# Patient Record
Sex: MALE | Race: OTHER | NOT HISPANIC OR LATINO | URBAN - METROPOLITAN AREA
[De-identification: names, ages, dates, MRNs, and addresses within clinical notes are randomized per-mention and may not be internally consistent; named-entity substitution may affect disease eponyms.]

---

## 2018-03-01 ENCOUNTER — OUTPATIENT (OUTPATIENT)
Dept: OUTPATIENT SERVICES | Facility: HOSPITAL | Age: 63
LOS: 1 days | End: 2018-03-01
Payer: MEDICAID

## 2018-03-01 PROCEDURE — G9001: CPT

## 2018-03-04 ENCOUNTER — EMERGENCY (EMERGENCY)
Facility: HOSPITAL | Age: 63
LOS: 1 days | Discharge: HOME | End: 2018-03-04
Attending: EMERGENCY MEDICINE | Admitting: EMERGENCY MEDICINE
Payer: MEDICAID

## 2018-03-04 VITALS
HEART RATE: 54 BPM | OXYGEN SATURATION: 97 % | SYSTOLIC BLOOD PRESSURE: 136 MMHG | TEMPERATURE: 98 F | DIASTOLIC BLOOD PRESSURE: 85 MMHG | RESPIRATION RATE: 16 BRPM

## 2018-03-04 PROCEDURE — 12001 RPR S/N/AX/GEN/TRNK 2.5CM/<: CPT

## 2018-03-04 PROCEDURE — 99283 EMERGENCY DEPT VISIT LOW MDM: CPT | Mod: 25

## 2018-03-04 PROCEDURE — 99282 EMERGENCY DEPT VISIT SF MDM: CPT | Mod: 25

## 2018-03-04 PROCEDURE — 90715 TDAP VACCINE 7 YRS/> IM: CPT

## 2018-03-04 PROCEDURE — 90471 IMMUNIZATION ADMIN: CPT

## 2018-03-04 RX ORDER — TETANUS TOXOID, REDUCED DIPHTHERIA TOXOID AND ACELLULAR PERTUSSIS VACCINE, ADSORBED 5; 2.5; 8; 8; 2.5 [IU]/.5ML; [IU]/.5ML; UG/.5ML; UG/.5ML; UG/.5ML
0.5 SUSPENSION INTRAMUSCULAR ONCE
Qty: 0 | Refills: 0 | Status: COMPLETED | OUTPATIENT
Start: 2018-03-04 | End: 2018-03-04

## 2018-03-04 RX ORDER — ACETAMINOPHEN 500 MG
975 TABLET ORAL ONCE
Qty: 0 | Refills: 0 | Status: COMPLETED | OUTPATIENT
Start: 2018-03-04 | End: 2018-03-04

## 2018-03-04 RX ADMIN — Medication 975 MILLIGRAM(S): at 13:28

## 2018-03-04 RX ADMIN — TETANUS TOXOID, REDUCED DIPHTHERIA TOXOID AND ACELLULAR PERTUSSIS VACCINE, ADSORBED 0.5 MILLILITER(S): 5; 2.5; 8; 8; 2.5 SUSPENSION INTRAMUSCULAR at 13:28

## 2018-03-04 NOTE — ED PROVIDER NOTE - OBJECTIVE STATEMENT
61yo male pt with PMHx of HTN, HLD, DM (FS- 120s), CAD s/p stents (on Plavix, ASA) c/o right dominant index finger tip laceration by a shaving blade yesterday 6pm. Pt stated the wound kept bleeding with 63yo male pt with PMHx of HTN, HLD, DM (FS- 120s), CAD s/p stents (on Plavix, ASA) c/o right dominant index finger tip laceration by a shaving blade yesterday 6pm. Pt stated the wound kept bleeding with soreness. Denies fever, chills or recent sickness. Denies sensory changes or weakness to fingers. Unknown TD.

## 2018-03-04 NOTE — ED ADULT NURSE NOTE - OBJECTIVE STATEMENT
61 yo male presents to ED with laceration to right index finger sustained last night with razor blade ~6PM. Patient on aspirin and plavix. Patient reports that wound continued to bleed into this morning so he came to ED.

## 2018-03-04 NOTE — ED PROVIDER NOTE - ATTENDING CONTRIBUTION TO CARE
63yo male pt with PMHx of HTN, HLD, DM (FS- 120s), CAD s/p stents (on Plavix, ASA) c/o right dominant index finger tip laceration by a shaving blade yesterday with no active bleeding to the tip, tissue loss, dermabond, after irrigation, no abx at this time, td.

## 2018-03-04 NOTE — ED PROVIDER NOTE - PHYSICAL EXAMINATION
NAD, VSS, Afebrile, + Right 2nd phalanx, superficial avulsion laceration (0.5cm) without bleeding, No swelling, eryth or cellulitis, N/V- intact.

## 2018-03-04 NOTE — ED ADULT NURSE NOTE - CHIEF COMPLAINT QUOTE
"I cut my right finger on a razor last night and it won't stop bleeding", pt takes plavix and aspirin, denies dizziness or lightheadedness.

## 2018-03-04 NOTE — ED ADULT TRIAGE NOTE - CHIEF COMPLAINT QUOTE
"I cut my right finger on a razor last night and it won't stop bleeding", pt takes plavix and aspirin,

## 2018-03-08 DIAGNOSIS — R69 ILLNESS, UNSPECIFIED: ICD-10-CM

## 2019-03-25 ENCOUNTER — EMERGENCY (EMERGENCY)
Facility: HOSPITAL | Age: 64
LOS: 1 days | Discharge: HOME | End: 2019-03-25
Attending: EMERGENCY MEDICINE | Admitting: EMERGENCY MEDICINE
Payer: MEDICAID

## 2019-03-25 VITALS
DIASTOLIC BLOOD PRESSURE: 87 MMHG | RESPIRATION RATE: 16 BRPM | OXYGEN SATURATION: 99 % | TEMPERATURE: 97 F | HEART RATE: 51 BPM | SYSTOLIC BLOOD PRESSURE: 181 MMHG

## 2019-03-25 VITALS
RESPIRATION RATE: 16 BRPM | TEMPERATURE: 98 F | OXYGEN SATURATION: 100 % | SYSTOLIC BLOOD PRESSURE: 145 MMHG | HEART RATE: 52 BPM | DIASTOLIC BLOOD PRESSURE: 82 MMHG

## 2019-03-25 LAB
ALBUMIN SERPL ELPH-MCNC: 4.3 G/DL — SIGNIFICANT CHANGE UP (ref 3.3–5)
ALP SERPL-CCNC: 71 U/L — SIGNIFICANT CHANGE UP (ref 40–120)
ALT FLD-CCNC: 22 U/L — SIGNIFICANT CHANGE UP (ref 4–41)
ANION GAP SERPL CALC-SCNC: 14 MMO/L — SIGNIFICANT CHANGE UP (ref 7–14)
APTT BLD: 35.4 SEC — SIGNIFICANT CHANGE UP (ref 27.5–36.3)
AST SERPL-CCNC: 19 U/L — SIGNIFICANT CHANGE UP (ref 4–40)
BASOPHILS # BLD AUTO: 0.03 K/UL — SIGNIFICANT CHANGE UP (ref 0–0.2)
BASOPHILS NFR BLD AUTO: 0.4 % — SIGNIFICANT CHANGE UP (ref 0–2)
BILIRUB SERPL-MCNC: 0.9 MG/DL — SIGNIFICANT CHANGE UP (ref 0.2–1.2)
BUN SERPL-MCNC: 10 MG/DL — SIGNIFICANT CHANGE UP (ref 7–23)
CALCIUM SERPL-MCNC: 9.5 MG/DL — SIGNIFICANT CHANGE UP (ref 8.4–10.5)
CHLORIDE SERPL-SCNC: 103 MMOL/L — SIGNIFICANT CHANGE UP (ref 98–107)
CO2 SERPL-SCNC: 25 MMOL/L — SIGNIFICANT CHANGE UP (ref 22–31)
CREAT SERPL-MCNC: 0.94 MG/DL — SIGNIFICANT CHANGE UP (ref 0.5–1.3)
EOSINOPHIL # BLD AUTO: 0.22 K/UL — SIGNIFICANT CHANGE UP (ref 0–0.5)
EOSINOPHIL NFR BLD AUTO: 3.2 % — SIGNIFICANT CHANGE UP (ref 0–6)
GLUCOSE SERPL-MCNC: 96 MG/DL — SIGNIFICANT CHANGE UP (ref 70–99)
HCT VFR BLD CALC: 43.5 % — SIGNIFICANT CHANGE UP (ref 39–50)
HGB BLD-MCNC: 14.3 G/DL — SIGNIFICANT CHANGE UP (ref 13–17)
IMM GRANULOCYTES NFR BLD AUTO: 0.4 % — SIGNIFICANT CHANGE UP (ref 0–1.5)
INR BLD: 1.1 — SIGNIFICANT CHANGE UP (ref 0.88–1.17)
LYMPHOCYTES # BLD AUTO: 1.63 K/UL — SIGNIFICANT CHANGE UP (ref 1–3.3)
LYMPHOCYTES # BLD AUTO: 23.6 % — SIGNIFICANT CHANGE UP (ref 13–44)
MCHC RBC-ENTMCNC: 29.5 PG — SIGNIFICANT CHANGE UP (ref 27–34)
MCHC RBC-ENTMCNC: 32.9 % — SIGNIFICANT CHANGE UP (ref 32–36)
MCV RBC AUTO: 89.9 FL — SIGNIFICANT CHANGE UP (ref 80–100)
MONOCYTES # BLD AUTO: 0.48 K/UL — SIGNIFICANT CHANGE UP (ref 0–0.9)
MONOCYTES NFR BLD AUTO: 6.9 % — SIGNIFICANT CHANGE UP (ref 2–14)
NEUTROPHILS # BLD AUTO: 4.53 K/UL — SIGNIFICANT CHANGE UP (ref 1.8–7.4)
NEUTROPHILS NFR BLD AUTO: 65.5 % — SIGNIFICANT CHANGE UP (ref 43–77)
NRBC # FLD: 0 K/UL — SIGNIFICANT CHANGE UP (ref 0–0)
PLATELET # BLD AUTO: 180 K/UL — SIGNIFICANT CHANGE UP (ref 150–400)
PMV BLD: 11.4 FL — SIGNIFICANT CHANGE UP (ref 7–13)
POTASSIUM SERPL-MCNC: 3.6 MMOL/L — SIGNIFICANT CHANGE UP (ref 3.5–5.3)
POTASSIUM SERPL-SCNC: 3.6 MMOL/L — SIGNIFICANT CHANGE UP (ref 3.5–5.3)
PROT SERPL-MCNC: 7.4 G/DL — SIGNIFICANT CHANGE UP (ref 6–8.3)
PROTHROM AB SERPL-ACNC: 12.3 SEC — SIGNIFICANT CHANGE UP (ref 9.8–13.1)
RBC # BLD: 4.84 M/UL — SIGNIFICANT CHANGE UP (ref 4.2–5.8)
RBC # FLD: 12.6 % — SIGNIFICANT CHANGE UP (ref 10.3–14.5)
SODIUM SERPL-SCNC: 142 MMOL/L — SIGNIFICANT CHANGE UP (ref 135–145)
TROPONIN T, HIGH SENSITIVITY: 10 NG/L — SIGNIFICANT CHANGE UP (ref ?–14)
WBC # BLD: 6.92 K/UL — SIGNIFICANT CHANGE UP (ref 3.8–10.5)
WBC # FLD AUTO: 6.92 K/UL — SIGNIFICANT CHANGE UP (ref 3.8–10.5)

## 2019-03-25 PROCEDURE — 99284 EMERGENCY DEPT VISIT MOD MDM: CPT

## 2019-03-25 RX ORDER — MECLIZINE HCL 12.5 MG
25 TABLET ORAL ONCE
Qty: 0 | Refills: 0 | Status: COMPLETED | OUTPATIENT
Start: 2019-03-25 | End: 2019-03-25

## 2019-03-25 RX ORDER — MECLIZINE HCL 12.5 MG
1 TABLET ORAL
Qty: 6 | Refills: 0
Start: 2019-03-25

## 2019-03-25 RX ADMIN — Medication 25 MILLIGRAM(S): at 20:58

## 2019-03-25 NOTE — ED PROVIDER NOTE - CLINICAL SUMMARY MEDICAL DECISION MAKING FREE TEXT BOX
PMHx of HTN, HLD, DM (FS- 120s), CAD s/p stents (on Plavix, ASA) c/o dizziness and b/l frontal headache, possibly 2/2 hypertension. Will f/u labs PMHx of HTN, HLD, DM (FS- 120s), CAD s/p stents (on Plavix, ASA) c/o dizziness and b/l frontal headache, possibly 2/2 vertigo

## 2019-03-25 NOTE — ED PROVIDER NOTE - NS ED ROS FT
REVIEW OF SYSTEMS:    CONSTITUTIONAL: No weakness, fevers or chills  EYES/ENT: No visual changes;  No vertigo or throat pain   NECK: No pain or stiffness  RESPIRATORY: No cough, wheezing, hemoptysis; Mild shortness of breath, however at baseline  CARDIOVASCULAR: No chest pain or palpitations  GASTROINTESTINAL: No abdominal or epigastric pain. No nausea, vomiting, or hematemesis; No diarrhea. No melena or hematochezia. + constipation  GENITOURINARY: No dysuria, frequency or hematuria  NEUROLOGICAL: No numbness or weakness  SKIN: No itching, rashes

## 2019-03-25 NOTE — ED PROVIDER NOTE - ATTENDING CONTRIBUTION TO CARE
Migue  pt here c/o dizziness/vertigo  assoc mild frontal HA ( 'a regular HA"  which he has had before)  Pt notes assoc nausea  dizziness worse with position change   no assoc CP  Pt noted BP higher than usual)    exam  clear lungs  card RRR S1S@  no signif murmur  abd benign  neuro  nl stength sensation and cerebellar  CN intact  mildly positive marilou-hallpike    which reprioduced sxs    plan  check routine labs  trial of meclizine  repeat BP in room at pt karen

## 2019-03-25 NOTE — ED ADULT NURSE NOTE - OBJECTIVE STATEMENT
Pt rec'd to ED R#15 Aox4, in NAD,  c/o elevated bp today- 180/100, nml BP is 140/80 approximately as per pt. Also endorses HA, nausea, generalized weakness, Denies fevers/ chills/ vomiting/ abd pain/ diarrhea/ urinary symptoms/ cough. fevers/ chills/ other acute complaints. Pt is in NAD, respirations spontaneous even and unlabored. EKG completed at triage. Family at bedside. Placed on continuous cardiac monitoring. IV placed, BW sent to lab. Bed in lowest locked position. Call bell within reach.

## 2019-03-25 NOTE — ED ADULT NURSE REASSESSMENT NOTE - NS ED NURSE REASSESS COMMENT FT1
pt received alert and oriented x3. nsr on cm . pt decline and pain at the moment. vss. Call bell in reach, warm blanket provided, bed in lowest position, side rails up x2,safety maintained. will continue to monitor.

## 2019-03-25 NOTE — ED PROVIDER NOTE - NSFOLLOWUPINSTRUCTIONS_ED_ALL_ED_FT
Please follow up with your primary care physician in 24-48 hours  A neurology follow up ha been provided for you  Please come back if any of the following: Fever, headache, changes in vision, nausea, vomiting, chest pain, shortness of breath or any major concern

## 2019-03-25 NOTE — ED PROVIDER NOTE - OBJECTIVE STATEMENT
Patient is a 64 yo M with PMHx of HTN, HLD, DM (FS- 120s), CAD s/p stents (on Plavix, ASA) c/o dizziness. Patient reports 1 day prior to presentation, he began to experience dizziness and b/l frontal headache. Patient took his BP at the time and found it to be elevated at 180s/100s. Patient denies any of the symptoms currently. Denies CP, palpitations, abdominal pain, N/V. Endorses mild SOB, however is a chronic problem for him and is at his baseline.

## 2019-03-25 NOTE — ED PROVIDER NOTE - PHYSICAL EXAMINATION
PHYSICAL EXAM:  GENERAL: NAD, well-developed  HEAD:  Atraumatic, Normocephalic  EYES: EOMI, PERRLA, conjunctiva and sclera clear  NECK: Supple, No JVD  CHEST/LUNG: Clear to auscultation bilaterally; No wheeze  HEART: Bradycardic; Regular rhythm; No murmurs, rubs, or gallops  ABDOMEN: Soft, Nontender, Nondistended; Bowel sounds present  EXTREMITIES:  2+ Peripheral Pulses, No clubbing, cyanosis, or edema  PSYCH: AAOx3  NEUROLOGY: non-focal  SKIN: No rashes or lesions

## 2019-03-25 NOTE — ED PROVIDER NOTE - PMH
Coronary artery disease, angina presence unspecified, unspecified vessel or lesion type, unspecified whether native or transplanted heart  s/p stents  Hyperlipidemia, unspecified hyperlipidemia type    Hypertension, unspecified type

## 2021-06-01 ENCOUNTER — INPATIENT (INPATIENT)
Facility: HOSPITAL | Age: 66
LOS: 2 days | Discharge: HOME | End: 2021-06-04
Attending: INTERNAL MEDICINE | Admitting: INTERNAL MEDICINE
Payer: MEDICAID

## 2021-06-01 VITALS
RESPIRATION RATE: 18 BRPM | OXYGEN SATURATION: 98 % | SYSTOLIC BLOOD PRESSURE: 169 MMHG | HEART RATE: 68 BPM | TEMPERATURE: 99 F | DIASTOLIC BLOOD PRESSURE: 95 MMHG

## 2021-06-01 DIAGNOSIS — I10 ESSENTIAL (PRIMARY) HYPERTENSION: ICD-10-CM

## 2021-06-01 DIAGNOSIS — R07.9 CHEST PAIN, UNSPECIFIED: ICD-10-CM

## 2021-06-01 DIAGNOSIS — E11.9 TYPE 2 DIABETES MELLITUS WITHOUT COMPLICATIONS: ICD-10-CM

## 2021-06-01 PROBLEM — I25.10 ATHEROSCLEROTIC HEART DISEASE OF NATIVE CORONARY ARTERY WITHOUT ANGINA PECTORIS: Chronic | Status: ACTIVE | Noted: 2019-03-25

## 2021-06-01 PROBLEM — E78.5 HYPERLIPIDEMIA, UNSPECIFIED: Chronic | Status: ACTIVE | Noted: 2019-03-25

## 2021-06-01 LAB
ALBUMIN SERPL ELPH-MCNC: 4.4 G/DL — SIGNIFICANT CHANGE UP (ref 3.3–5)
ALP SERPL-CCNC: 75 U/L — SIGNIFICANT CHANGE UP (ref 40–120)
ALT FLD-CCNC: 30 U/L — SIGNIFICANT CHANGE UP (ref 4–41)
ANION GAP SERPL CALC-SCNC: 11 MMOL/L — SIGNIFICANT CHANGE UP (ref 7–14)
AST SERPL-CCNC: 22 U/L — SIGNIFICANT CHANGE UP (ref 4–40)
BASOPHILS # BLD AUTO: 0.03 K/UL — SIGNIFICANT CHANGE UP (ref 0–0.2)
BASOPHILS NFR BLD AUTO: 0.4 % — SIGNIFICANT CHANGE UP (ref 0–2)
BILIRUB SERPL-MCNC: 0.6 MG/DL — SIGNIFICANT CHANGE UP (ref 0.2–1.2)
BUN SERPL-MCNC: 12 MG/DL — SIGNIFICANT CHANGE UP (ref 7–23)
CALCIUM SERPL-MCNC: 10 MG/DL — SIGNIFICANT CHANGE UP (ref 8.4–10.5)
CHLORIDE SERPL-SCNC: 101 MMOL/L — SIGNIFICANT CHANGE UP (ref 98–107)
CO2 SERPL-SCNC: 27 MMOL/L — SIGNIFICANT CHANGE UP (ref 22–31)
CREAT SERPL-MCNC: 1.17 MG/DL — SIGNIFICANT CHANGE UP (ref 0.5–1.3)
EOSINOPHIL # BLD AUTO: 0.29 K/UL — SIGNIFICANT CHANGE UP (ref 0–0.5)
EOSINOPHIL NFR BLD AUTO: 4.2 % — SIGNIFICANT CHANGE UP (ref 0–6)
GLUCOSE SERPL-MCNC: 119 MG/DL — HIGH (ref 70–99)
HCT VFR BLD CALC: 39.3 % — SIGNIFICANT CHANGE UP (ref 39–50)
HGB BLD-MCNC: 12.7 G/DL — LOW (ref 13–17)
IANC: 3.8 K/UL — SIGNIFICANT CHANGE UP (ref 1.5–8.5)
IMM GRANULOCYTES NFR BLD AUTO: 0.4 % — SIGNIFICANT CHANGE UP (ref 0–1.5)
LYMPHOCYTES # BLD AUTO: 2.12 K/UL — SIGNIFICANT CHANGE UP (ref 1–3.3)
LYMPHOCYTES # BLD AUTO: 30.8 % — SIGNIFICANT CHANGE UP (ref 13–44)
MCHC RBC-ENTMCNC: 29.7 PG — SIGNIFICANT CHANGE UP (ref 27–34)
MCHC RBC-ENTMCNC: 32.3 GM/DL — SIGNIFICANT CHANGE UP (ref 32–36)
MCV RBC AUTO: 92 FL — SIGNIFICANT CHANGE UP (ref 80–100)
MONOCYTES # BLD AUTO: 0.62 K/UL — SIGNIFICANT CHANGE UP (ref 0–0.9)
MONOCYTES NFR BLD AUTO: 9 % — SIGNIFICANT CHANGE UP (ref 2–14)
NEUTROPHILS # BLD AUTO: 3.8 K/UL — SIGNIFICANT CHANGE UP (ref 1.8–7.4)
NEUTROPHILS NFR BLD AUTO: 55.2 % — SIGNIFICANT CHANGE UP (ref 43–77)
NRBC # BLD: 0 /100 WBCS — SIGNIFICANT CHANGE UP
NRBC # FLD: 0 K/UL — SIGNIFICANT CHANGE UP
NT-PROBNP SERPL-SCNC: 96 PG/ML — SIGNIFICANT CHANGE UP
PLATELET # BLD AUTO: 210 K/UL — SIGNIFICANT CHANGE UP (ref 150–400)
POTASSIUM SERPL-MCNC: 3.5 MMOL/L — SIGNIFICANT CHANGE UP (ref 3.5–5.3)
POTASSIUM SERPL-SCNC: 3.5 MMOL/L — SIGNIFICANT CHANGE UP (ref 3.5–5.3)
PROT SERPL-MCNC: 7.7 G/DL — SIGNIFICANT CHANGE UP (ref 6–8.3)
RBC # BLD: 4.27 M/UL — SIGNIFICANT CHANGE UP (ref 4.2–5.8)
RBC # FLD: 14.1 % — SIGNIFICANT CHANGE UP (ref 10.3–14.5)
SARS-COV-2 RNA SPEC QL NAA+PROBE: SIGNIFICANT CHANGE UP
SODIUM SERPL-SCNC: 139 MMOL/L — SIGNIFICANT CHANGE UP (ref 135–145)
TROPONIN T, HIGH SENSITIVITY RESULT: 10 NG/L — SIGNIFICANT CHANGE UP
TROPONIN T, HIGH SENSITIVITY RESULT: 9 NG/L — SIGNIFICANT CHANGE UP
WBC # BLD: 6.89 K/UL — SIGNIFICANT CHANGE UP (ref 3.8–10.5)
WBC # FLD AUTO: 6.89 K/UL — SIGNIFICANT CHANGE UP (ref 3.8–10.5)

## 2021-06-01 PROCEDURE — 71046 X-RAY EXAM CHEST 2 VIEWS: CPT | Mod: 26

## 2021-06-01 PROCEDURE — 99284 EMERGENCY DEPT VISIT MOD MDM: CPT

## 2021-06-01 PROCEDURE — 99223 1ST HOSP IP/OBS HIGH 75: CPT

## 2021-06-01 RX ORDER — DEXTROSE 50 % IN WATER 50 %
15 SYRINGE (ML) INTRAVENOUS ONCE
Refills: 0 | Status: DISCONTINUED | OUTPATIENT
Start: 2021-06-01 | End: 2021-06-04

## 2021-06-01 RX ORDER — METOPROLOL TARTRATE 50 MG
100 TABLET ORAL DAILY
Refills: 0 | Status: DISCONTINUED | OUTPATIENT
Start: 2021-06-01 | End: 2021-06-02

## 2021-06-01 RX ORDER — ESCITALOPRAM OXALATE 10 MG/1
10 TABLET, FILM COATED ORAL DAILY
Refills: 0 | Status: DISCONTINUED | OUTPATIENT
Start: 2021-06-01 | End: 2021-06-03

## 2021-06-01 RX ORDER — DEXTROSE 50 % IN WATER 50 %
25 SYRINGE (ML) INTRAVENOUS ONCE
Refills: 0 | Status: DISCONTINUED | OUTPATIENT
Start: 2021-06-01 | End: 2021-06-04

## 2021-06-01 RX ORDER — DEXTROSE 50 % IN WATER 50 %
12.5 SYRINGE (ML) INTRAVENOUS ONCE
Refills: 0 | Status: DISCONTINUED | OUTPATIENT
Start: 2021-06-01 | End: 2021-06-04

## 2021-06-01 RX ORDER — TAMSULOSIN HYDROCHLORIDE 0.4 MG/1
0.4 CAPSULE ORAL AT BEDTIME
Refills: 0 | Status: DISCONTINUED | OUTPATIENT
Start: 2021-06-01 | End: 2021-06-04

## 2021-06-01 RX ORDER — SODIUM CHLORIDE 9 MG/ML
1000 INJECTION, SOLUTION INTRAVENOUS
Refills: 0 | Status: DISCONTINUED | OUTPATIENT
Start: 2021-06-01 | End: 2021-06-04

## 2021-06-01 RX ORDER — INSULIN LISPRO 100/ML
VIAL (ML) SUBCUTANEOUS AT BEDTIME
Refills: 0 | Status: DISCONTINUED | OUTPATIENT
Start: 2021-06-01 | End: 2021-06-04

## 2021-06-01 RX ORDER — SIMVASTATIN 20 MG/1
20 TABLET, FILM COATED ORAL AT BEDTIME
Refills: 0 | Status: DISCONTINUED | OUTPATIENT
Start: 2021-06-01 | End: 2021-06-04

## 2021-06-01 RX ORDER — FOLIC ACID 0.8 MG
1 TABLET ORAL DAILY
Refills: 0 | Status: DISCONTINUED | OUTPATIENT
Start: 2021-06-01 | End: 2021-06-04

## 2021-06-01 RX ORDER — FAMOTIDINE 10 MG/ML
20 INJECTION INTRAVENOUS
Refills: 0 | Status: DISCONTINUED | OUTPATIENT
Start: 2021-06-01 | End: 2021-06-04

## 2021-06-01 RX ORDER — LOSARTAN POTASSIUM 100 MG/1
100 TABLET, FILM COATED ORAL DAILY
Refills: 0 | Status: DISCONTINUED | OUTPATIENT
Start: 2021-06-01 | End: 2021-06-04

## 2021-06-01 RX ORDER — CLOPIDOGREL BISULFATE 75 MG/1
75 TABLET, FILM COATED ORAL DAILY
Refills: 0 | Status: DISCONTINUED | OUTPATIENT
Start: 2021-06-01 | End: 2021-06-04

## 2021-06-01 RX ORDER — ENOXAPARIN SODIUM 100 MG/ML
40 INJECTION SUBCUTANEOUS DAILY
Refills: 0 | Status: DISCONTINUED | OUTPATIENT
Start: 2021-06-01 | End: 2021-06-04

## 2021-06-01 RX ORDER — INSULIN LISPRO 100/ML
VIAL (ML) SUBCUTANEOUS
Refills: 0 | Status: DISCONTINUED | OUTPATIENT
Start: 2021-06-01 | End: 2021-06-04

## 2021-06-01 RX ORDER — GLUCAGON INJECTION, SOLUTION 0.5 MG/.1ML
1 INJECTION, SOLUTION SUBCUTANEOUS ONCE
Refills: 0 | Status: DISCONTINUED | OUTPATIENT
Start: 2021-06-01 | End: 2021-06-04

## 2021-06-01 RX ORDER — ASPIRIN/CALCIUM CARB/MAGNESIUM 324 MG
81 TABLET ORAL DAILY
Refills: 0 | Status: DISCONTINUED | OUTPATIENT
Start: 2021-06-01 | End: 2021-06-03

## 2021-06-01 RX ORDER — ASPIRIN/CALCIUM CARB/MAGNESIUM 324 MG
162 TABLET ORAL ONCE
Refills: 0 | Status: COMPLETED | OUTPATIENT
Start: 2021-06-01 | End: 2021-06-01

## 2021-06-01 RX ADMIN — Medication 162 MILLIGRAM(S): at 17:23

## 2021-06-01 NOTE — ED PROVIDER NOTE - PHYSICAL EXAMINATION
[Const] well-appearing, resting comfortably, no acute distress  [HEENT] PERRL, EOMI, moist mucus membranes  [Neck] Supple, trachea midline  [CV] +S1/S2, no m/r/g appreciated  [Lungs] Clear to auscultations bilaterally, no adventitious lung sounds  [Abd] soft, non-tender, nondistended in all 4 quadrants  [MSK] 5/5 upper extremity and lower extremity str bilaterally  [Skin] warm, dry, well-perfused  [Neuro] A&Ox3

## 2021-06-01 NOTE — H&P ADULT - NSHPREVIEWOFSYSTEMS_GEN_ALL_CORE
Constitutional Symptoms: No weakness, fevers, chills, weight loss  Eyes: No visual changes, headache, eye pain, double vision  Ears, Nose, Mouth, Throat: No runny nose, sinus pain, ear pain, tinnitus, sore throat, dysphagia, odynophagia  Cardiovascular: +chest pain, no palpitations, edema  Respiratory: No cough, wheezing, hemoptysis, shortness of breath  Gastrointestinal: No abdominal pain, dysphagia, anorexia, nausea/vomiting, diarrhea/constipation, hematemesis, BRBPR, melena  Genitourinary: No dysuria, frequency, hematuria  Musculoskeletal: No joint pain, joint swelling, decreased ROM  Skin: No pruritus, rashes, lesions, wounds  Neurologic:  No seizures, headache, paraesthesia, numbness, limb weakness    Positives and pertinent negatives noted and all other systems negative.

## 2021-06-01 NOTE — H&P ADULT - HISTORY OF PRESENT ILLNESS
Patient is a 66 y/o M PMH HTN, HLD, DM2, CAD s/p stents 2015 (on ASA/Plavix) p/w 2 months of worsening chest pain/exertional pressure w/ orthopnea. Visiting from [facility], has been here for 3 days, hasn't seen his cardiologist in 2 years, last stress 2 years ago reportedly normal. Has not been evaluated in the past 2 months that he has had chest pain.

## 2021-06-01 NOTE — ED ADULT NURSE REASSESSMENT NOTE - NS ED NURSE REASSESS COMMENT FT1
Received report from CINDY Bell. Pt is A&Ox4, ambulatory. Breathing even and unlabored. NSR on the cardiac monitor. No complains os pain ro discomfort. Pt picked up by transporter.

## 2021-06-01 NOTE — ED PROVIDER NOTE - OBJECTIVE STATEMENT
64 yo M with PMHx of HTN, HLD, DM CAD s/p stents (on Plavix, ASA) presents with 2 months worsening chest pain/exertional pressure, also some SOB when lying down. Visiting from [facility], has been here for 3 days, hasn't seen his cardiologist in 2 years, last stress 2 years ago reportedly normal.     PCP: Tyler  Cards: Varinder Reis 66 yo M with PMHx of HTN, HLD, DM CAD s/p stents (on Plavix, ASA) presents with 2 months worsening chest pain/exertional pressure, also some SOB when lying down. Visiting from [facility], has been here for 3 days, hasn't seen his cardiologist in 2 years, last stress 2 years ago reportedly normal.     PCP: Tyler  Cards: Varinder Reis    Attendinyo male presents with chest pain with exertion for about 2 months.  no pain at rest.  has history of CAD with stent.

## 2021-06-01 NOTE — ED ADULT TRIAGE NOTE - CHIEF COMPLAINT QUOTE
Pt c/o left sided chest pain, intermittent for the past 3 months. PMH of cardiac stents. Pt states he hasn't been to a doctor because he was in [place] and came back 3 days ago. Reports occasional SOB, denies cough or fever.

## 2021-06-01 NOTE — ED PROVIDER NOTE - CLINICAL SUMMARY MEDICAL DECISION MAKING FREE TEXT BOX
64 y/o M w/ hx htn hld dm cad stents 2015, hasn't seen cards in 2 years, feels sx similar to MI in 2015. PEx benign, non-toxic, asa 162 given, obtain labwork, trop,ekg, likely admission for cards.

## 2021-06-01 NOTE — H&P ADULT - NSHPPHYSICALEXAM_GEN_ALL_CORE
T(C): 37 (06-01-21 @ 15:07), Max: 37 (06-01-21 @ 15:07)  HR: 60 (06-01-21 @ 18:49) (60 - 68)  BP: 167/92 (06-01-21 @ 18:49) (167/92 - 169/95)  RR: 17 (06-01-21 @ 18:49) (17 - 18)  SpO2: 99% (06-01-21 @ 18:49) (98% - 99%)    Constitutional: NAD, well-developed, well-nourished  Ears, Nose, Mouth, and Throat: normal external ears and nose, normal hearing, moist oral mucosa  Eyes: normal conjunctiva, EOMI, PERRL  Neck: supple, no JVD  Respiratory: Clear to auscultation bilaterally. No wheezes, rales or rhonchi. Normal respiratory effort  Cardiovascular: RRR, no M/R/G, no edema, 2+ Peripheral Pulses  Gastrointestinal: soft, nontender, nondistended, +BS, no hernia  Skin: warm, dry, no rash  Neurologic: sensation grossly intact, CN grossly intact, non-focal exam  Musculoskeletal: no clubbing, no cyanosis, no joint swelling  Psychiatric: AOX3, appropriate mood, affect

## 2021-06-01 NOTE — H&P ADULT - NSHPLABSRESULTS_GEN_ALL_CORE
06-01    139  |  101  |  12  ----------------------------<  119<H>  3.5   |  27  |  1.17    Ca    10.0      01 Jun 2021 16:57    TPro  7.7  /  Alb  4.4  /  TBili  0.6  /  DBili  x   /  AST  22  /  ALT  30  /  AlkPhos  75  06-01                        12.7   6.89  )-----------( 210      ( 01 Jun 2021 16:57 )             39.3     LIVER FUNCTIONS - ( 01 Jun 2021 16:57 )  Alb: 4.4 g/dL / Pro: 7.7 g/dL / ALK PHOS: 75 U/L / ALT: 30 U/L / AST: 22 U/L / GGT: x    EKG personally reviewed, NSR, LAD, otherwise no acute findings.

## 2021-06-01 NOTE — H&P ADULT - NSICDXPASTMEDICALHX_GEN_ALL_CORE_FT
PAST MEDICAL HISTORY:  Coronary artery disease, angina presence unspecified, unspecified vessel or lesion type, unspecified whether native or transplanted heart s/p stents    Hyperlipidemia, unspecified hyperlipidemia type     Hypertension, unspecified type

## 2021-06-01 NOTE — ED ADULT NURSE NOTE - NSIMPLEMENTINTERV_GEN_ALL_ED
Implemented All Fall with Harm Risk Interventions:  [place] to call system. Call bell, personal items and telephone within reach. Instruct patient to call for assistance. Room bathroom lighting operational. Non-slip footwear when patient is off stretcher. Physically safe environment: no spills, clutter or unnecessary equipment. Stretcher in lowest position, wheels locked, appropriate side rails in place. Provide visual cue, wrist band, yellow gown, etc. Monitor gait and stability. Monitor for mental status changes and reorient to person, place, and time. Review medications for side effects contributing to fall risk. Reinforce activity limits and safety measures with patient and family. Provide visual clues: red socks.

## 2021-06-01 NOTE — ED ADULT NURSE NOTE - OBJECTIVE STATEMENT
Break covering RN: 66 y/o M received to room 21 c/o cp. Pt a&ox4 and ambulatory. pt states for 3 mo he's had intermittent cp worse with movement. Pt has hx of htn, 2 stents on plavix, dm, and hld. Pt endorsing trouble breathing when lying flat and with movement. pt abdomen soft nontender nondistended. pt skin intact. pt denies fevers, chills, sob, ha, palpations, n/v/d, diaphoresis or lightheadedness. VS as noted call bell in reach, comfort measures provided, 18G IV placed L AC, pt placed on cardiac monitor reading nsr. will continue to monitor.

## 2021-06-02 LAB
A1C WITH ESTIMATED AVERAGE GLUCOSE RESULT: 6.7 % — HIGH (ref 4–5.6)
ALBUMIN SERPL ELPH-MCNC: 3.8 G/DL — SIGNIFICANT CHANGE UP (ref 3.3–5)
ALP SERPL-CCNC: 70 U/L — SIGNIFICANT CHANGE UP (ref 40–120)
ALT FLD-CCNC: 32 U/L — SIGNIFICANT CHANGE UP (ref 4–41)
ANION GAP SERPL CALC-SCNC: 12 MMOL/L — SIGNIFICANT CHANGE UP (ref 7–14)
APTT BLD: 30.2 SEC — SIGNIFICANT CHANGE UP (ref 27–36.3)
AST SERPL-CCNC: 22 U/L — SIGNIFICANT CHANGE UP (ref 4–40)
BASOPHILS # BLD AUTO: 0.03 K/UL — SIGNIFICANT CHANGE UP (ref 0–0.2)
BASOPHILS NFR BLD AUTO: 0.5 % — SIGNIFICANT CHANGE UP (ref 0–2)
BILIRUB SERPL-MCNC: 0.5 MG/DL — SIGNIFICANT CHANGE UP (ref 0.2–1.2)
BUN SERPL-MCNC: 9 MG/DL — SIGNIFICANT CHANGE UP (ref 7–23)
CALCIUM SERPL-MCNC: 8.8 MG/DL — SIGNIFICANT CHANGE UP (ref 8.4–10.5)
CHLORIDE SERPL-SCNC: 104 MMOL/L — SIGNIFICANT CHANGE UP (ref 98–107)
CO2 SERPL-SCNC: 23 MMOL/L — SIGNIFICANT CHANGE UP (ref 22–31)
COVID-19 SPIKE DOMAIN AB INTERP: POSITIVE
COVID-19 SPIKE DOMAIN ANTIBODY RESULT: 15.9 U/ML — HIGH
CREAT SERPL-MCNC: 0.9 MG/DL — SIGNIFICANT CHANGE UP (ref 0.5–1.3)
EOSINOPHIL # BLD AUTO: 0.29 K/UL — SIGNIFICANT CHANGE UP (ref 0–0.5)
EOSINOPHIL NFR BLD AUTO: 5.2 % — SIGNIFICANT CHANGE UP (ref 0–6)
ESTIMATED AVERAGE GLUCOSE: 146 MG/DL — HIGH (ref 68–114)
GLUCOSE SERPL-MCNC: 194 MG/DL — HIGH (ref 70–99)
HCT VFR BLD CALC: 37.6 % — LOW (ref 39–50)
HCV AB S/CO SERPL IA: 0.11 S/CO — SIGNIFICANT CHANGE UP (ref 0–0.99)
HCV AB SERPL-IMP: SIGNIFICANT CHANGE UP
HGB BLD-MCNC: 12.3 G/DL — LOW (ref 13–17)
IANC: 3.41 K/UL — SIGNIFICANT CHANGE UP (ref 1.5–8.5)
IMM GRANULOCYTES NFR BLD AUTO: 0.4 % — SIGNIFICANT CHANGE UP (ref 0–1.5)
INR BLD: 1.09 RATIO — SIGNIFICANT CHANGE UP (ref 0.88–1.16)
LYMPHOCYTES # BLD AUTO: 1.32 K/UL — SIGNIFICANT CHANGE UP (ref 1–3.3)
LYMPHOCYTES # BLD AUTO: 23.8 % — SIGNIFICANT CHANGE UP (ref 13–44)
MAGNESIUM SERPL-MCNC: 2.1 MG/DL — SIGNIFICANT CHANGE UP (ref 1.6–2.6)
MCHC RBC-ENTMCNC: 30.1 PG — SIGNIFICANT CHANGE UP (ref 27–34)
MCHC RBC-ENTMCNC: 32.7 GM/DL — SIGNIFICANT CHANGE UP (ref 32–36)
MCV RBC AUTO: 91.9 FL — SIGNIFICANT CHANGE UP (ref 80–100)
MONOCYTES # BLD AUTO: 0.47 K/UL — SIGNIFICANT CHANGE UP (ref 0–0.9)
MONOCYTES NFR BLD AUTO: 8.5 % — SIGNIFICANT CHANGE UP (ref 2–14)
NEUTROPHILS # BLD AUTO: 3.41 K/UL — SIGNIFICANT CHANGE UP (ref 1.8–7.4)
NEUTROPHILS NFR BLD AUTO: 61.6 % — SIGNIFICANT CHANGE UP (ref 43–77)
NRBC # BLD: 0 /100 WBCS — SIGNIFICANT CHANGE UP
NRBC # FLD: 0 K/UL — SIGNIFICANT CHANGE UP
PHOSPHATE SERPL-MCNC: 2.5 MG/DL — SIGNIFICANT CHANGE UP (ref 2.5–4.5)
PLATELET # BLD AUTO: 197 K/UL — SIGNIFICANT CHANGE UP (ref 150–400)
POTASSIUM SERPL-MCNC: 3.4 MMOL/L — LOW (ref 3.5–5.3)
POTASSIUM SERPL-SCNC: 3.4 MMOL/L — LOW (ref 3.5–5.3)
PROT SERPL-MCNC: 6.9 G/DL — SIGNIFICANT CHANGE UP (ref 6–8.3)
PROTHROM AB SERPL-ACNC: 12.5 SEC — SIGNIFICANT CHANGE UP (ref 10.6–13.6)
RBC # BLD: 4.09 M/UL — LOW (ref 4.2–5.8)
RBC # FLD: 14.1 % — SIGNIFICANT CHANGE UP (ref 10.3–14.5)
SARS-COV-2 IGG+IGM SERPL QL IA: 15.9 U/ML — HIGH
SARS-COV-2 IGG+IGM SERPL QL IA: POSITIVE
SODIUM SERPL-SCNC: 139 MMOL/L — SIGNIFICANT CHANGE UP (ref 135–145)
WBC # BLD: 5.54 K/UL — SIGNIFICANT CHANGE UP (ref 3.8–10.5)
WBC # FLD AUTO: 5.54 K/UL — SIGNIFICANT CHANGE UP (ref 3.8–10.5)

## 2021-06-02 RX ORDER — POTASSIUM CHLORIDE 20 MEQ
40 PACKET (EA) ORAL ONCE
Refills: 0 | Status: COMPLETED | OUTPATIENT
Start: 2021-06-02 | End: 2021-06-02

## 2021-06-02 RX ORDER — METOPROLOL TARTRATE 50 MG
75 TABLET ORAL DAILY
Refills: 0 | Status: DISCONTINUED | OUTPATIENT
Start: 2021-06-03 | End: 2021-06-03

## 2021-06-02 RX ADMIN — ESCITALOPRAM OXALATE 10 MILLIGRAM(S): 10 TABLET, FILM COATED ORAL at 11:52

## 2021-06-02 RX ADMIN — FAMOTIDINE 20 MILLIGRAM(S): 10 INJECTION INTRAVENOUS at 17:07

## 2021-06-02 RX ADMIN — SIMVASTATIN 20 MILLIGRAM(S): 20 TABLET, FILM COATED ORAL at 21:25

## 2021-06-02 RX ADMIN — Medication 81 MILLIGRAM(S): at 11:52

## 2021-06-02 RX ADMIN — LOSARTAN POTASSIUM 100 MILLIGRAM(S): 100 TABLET, FILM COATED ORAL at 06:06

## 2021-06-02 RX ADMIN — ENOXAPARIN SODIUM 40 MILLIGRAM(S): 100 INJECTION SUBCUTANEOUS at 11:51

## 2021-06-02 RX ADMIN — Medication 40 MILLIEQUIVALENT(S): at 17:06

## 2021-06-02 RX ADMIN — CLOPIDOGREL BISULFATE 75 MILLIGRAM(S): 75 TABLET, FILM COATED ORAL at 11:52

## 2021-06-02 RX ADMIN — Medication 1 MILLIGRAM(S): at 11:52

## 2021-06-02 RX ADMIN — FAMOTIDINE 20 MILLIGRAM(S): 10 INJECTION INTRAVENOUS at 06:06

## 2021-06-02 RX ADMIN — TAMSULOSIN HYDROCHLORIDE 0.4 MILLIGRAM(S): 0.4 CAPSULE ORAL at 21:23

## 2021-06-02 NOTE — CONSULT NOTE ADULT - ASSESSMENT
Assessment and Plan    66 y/o M PMH HTN, HLD, DM2, CAD s/p stents 2015 (on ASA/Plavix) p/w 2 months of worsening chest pain/exertional pressure     EKG: NSR no STT changes   Tele: SB 50s    1. Chest pain  -exertional   -trops 9-10  -CXR clear  -echo pending  -hx of CAD with stents in 2015, has not followed up with cardiology since. likely will plan for cath tomorrow     2. CAD s/p stents  -2 stents in 2015 as per patient  -c/w asa, plavix and lipitor     3. HTN  -controlled  -continue with metoprolol and losartan  -continue to monitor BP    4. DVT prophylaxis  -lovenox subq   Assessment and Plan    66 y/o M PMH HTN, HLD, DM2, CAD s/p stents 2015 (on ASA/Plavix) p/w 2 months of worsening chest pain/exertional pressure     EKG: NSR no STT changes   Tele: SB 50s    1. Chest pain  -exertional   -trops 9-10  -CXR clear  -echo pending  -hx of CAD with stents in 2015, has not followed up with cardiology since. likely will plan for cath tomorrow     2. CAD s/p stents  -2 stents in 2015 as per patient  -c/w asa, plavix and lipitor     3. HTN  -controlled  -continue with metoprolol and losartan  -metoprolol decreased to 75mg daily 2/2 bradycardia  -continue to monitor BP    4. DVT prophylaxis  -lovenox subq

## 2021-06-02 NOTE — CONSULT NOTE ADULT - ASSESSMENT
65 M PMH HTN, HLD, DM2, CAD s/p stents 2015 (on ASA/Plavix) p/w chest pain    Chest pain Cards following.   Plan for cath am tomorrow     DM a1c 6.7. HISS     HTN Controlled, lowered Metoprolol secondary to Bradycardia

## 2021-06-02 NOTE — CONSULT NOTE ADULT - SUBJECTIVE AND OBJECTIVE BOX
Barrett Yee MD  Interventional Cardiology / Advance Heart Failure and Cardiac Transplant Specialist  [place] Office : [phone] [place] [address] [place]. [address]  Tel:   [place] Office : [address] [facility] [place] [address]  Tel: [phone]  Cell : [phone]    HISTORY OF PRESENTING ILLNESS:  64 y/o M PMH HTN, HLD, DM2, CAD s/p stents 2015 (on ASA/Plavix) p/w 2 months of worsening chest pain/exertional pressure w/ orthopnea. Just came back fro 15months in [facility]. [facility] had two stents placed in 2015 and has not followed up with cardiologist since. Complains of chest pain on exertion. Denies SOB or palpitations.       MEDICATIONS:  aspirin enteric coated 81 milliGRAM(s) Oral daily  clopidogrel Tablet 75 milliGRAM(s) Oral daily  enoxaparin Injectable 40 milliGRAM(s) SubCutaneous daily  losartan 100 milliGRAM(s) Oral daily  metoprolol succinate  milliGRAM(s) Oral daily  tamsulosin 0.4 milliGRAM(s) Oral at bedtime        escitalopram 10 milliGRAM(s) Oral daily    famotidine    Tablet 20 milliGRAM(s) Oral two times a day    dextrose 40% Gel 15 Gram(s) Oral once  dextrose 50% Injectable 25 Gram(s) IV Push once  dextrose 50% Injectable 12.5 Gram(s) IV Push once  dextrose 50% Injectable 25 Gram(s) IV Push once  glucagon  Injectable 1 milliGRAM(s) IntraMuscular once  insulin lispro (ADMELOG) corrective regimen sliding scale   SubCutaneous three times a day before meals  insulin lispro (ADMELOG) corrective regimen sliding scale   SubCutaneous at bedtime  simvastatin 20 milliGRAM(s) Oral at bedtime    dextrose 5%. 1000 milliLiter(s) IV Continuous <Continuous>  dextrose 5%. 1000 milliLiter(s) IV Continuous <Continuous>  folic acid 1 milliGRAM(s) Oral daily      PAST MEDICAL/SURGICAL HISTORY  PAST MEDICAL & SURGICAL HISTORY:  Hypertension, unspecified type    Hyperlipidemia, unspecified hyperlipidemia type    Coronary artery disease, angina presence unspecified, unspecified vessel or lesion type, unspecified whether native or transplanted heart  s/p stents    No significant past surgical history        SOCIAL HISTORY: Substance Use (street drugs): ( x ) never used  (  ) other:    FAMILY HISTORY:  No pertinent family history in first degree relatives        REVIEW OF SYSTEMS:  CONSTITUTIONAL: No fever, weight loss, or fatigue  EYES: No eye pain, visual disturbances, or discharge  ENMT:  No difficulty hearing, tinnitus, vertigo; No sinus or throat pain  BREASTS: No pain, masses, or nipple discharge  GASTROINTESTINAL: No abdominal or epigastric pain. No nausea, vomiting, or hematemesis; No diarrhea or constipation. No melena or hematochezia.  GENITOURINARY: No dysuria, frequency, hematuria, or incontinence  NEUROLOGICAL: No headaches, memory loss, loss of strength, numbness, or tremors  ENDOCRINE: No heat or cold intolerance; No hair loss  MUSCULOSKELETAL: No joint pain or swelling; No muscle, back, or extremity pain  PSYCHIATRIC: No depression, anxiety, mood swings, or difficulty sleeping  HEME/LYMPH: No easy bruising, or bleeding gums  All others negative    PHYSICAL EXAM:  T(C): 36.4 (06-02-21 @ 11:51), Max: 37 (06-01-21 @ 15:07)  HR: 60 (06-02-21 @ 11:51) (57 - 68)  BP: 149/93 (06-02-21 @ 11:51) (134/75 - 169/95)  RR: 16 (06-02-21 @ 11:51) (16 - 18)  SpO2: 100% (06-02-21 @ 11:51) (98% - 100%)  Wt(kg): --  I&O's Summary    01 Jun 2021 07:01  -  02 Jun 2021 07:00  --------------------------------------------------------  IN: 200 mL / OUT: 0 mL / NET: 200 mL    02 Jun 2021 07:01  -  02 Jun 2021 13:13  --------------------------------------------------------  IN: 200 mL / OUT: 0 mL / NET: 200 mL      Height (cm): 167.6 (06-01 @ 23:27)  Weight (kg): 70 (06-01 @ 23:27)  BMI (kg/m2): 24.9 (06-01 @ 23:27)  BSA (m2): 1.79 (06-01 @ 23:27)    GENERAL: NAD  EYES: EOMI, PERRLA, conjunctiva and sclera clear  ENMT: No tonsillar erythema, exudates, or enlargement  Cardiovascular: Normal S1 S2, No JVD, No murmurs, No edema  Respiratory: Lungs clear to auscultation	  Gastrointestinal:  Soft, Non-tender, + BS	  Extremities: No edema  NERVOUS SYSTEM:  Alert & Oriented X3                                    12.3   5.54  )-----------( 197      ( 02 Jun 2021 07:54 )             37.6     06-02    139  |  104  |  9   ----------------------------<  194<H>  3.4<L>   |  23  |  0.90    Ca    8.8      02 Jun 2021 07:54  Phos  2.5     06-02  Mg     2.1     06-02    TPro  6.9  /  Alb  3.8  /  TBili  0.5  /  DBili  x   /  AST  22  /  ALT  32  /  AlkPhos  70  06-02    proBNP: Serum Pro-Brain Natriuretic Peptide: 96 pg/mL (06-01 @ 16:57)    Lipid Profile:   HgA1c:   TSH:     Consultant(s) Notes Reviewed:  [x ] YES  [ ] NO    Care Discussed with Consultants/Other Providers [ x] YES  [ ] NO    Imaging Personally Reviewed independently:  [x] YES  [ ] NO    All labs, radiologic studies, vitals, orders and medications list reviewed. Patient is seen and examined at bedside. Case discussed with medical team.        
HPI:  Patient is a 64 y/o M PMH HTN, HLD, DM2, CAD s/p stents 2015 (on ASA/Plavix) p/w 2 months of worsening chest pain/exertional pressure w/ orthopnea. Visiting from [facility], has been here for 3 days, hasn't seen his cardiologist in 2 years, last stress 2 years ago reportedly normal. Has not been evaluated in the past 2 months that he has had chest pain. (01 Jun 2021 21:58)      PAST MEDICAL & SURGICAL HISTORY:  Hypertension, unspecified type    Hyperlipidemia, unspecified hyperlipidemia type    Coronary artery disease, angina presence unspecified, unspecified vessel or lesion type, unspecified whether native or transplanted heart  s/p stents    No significant past surgical history        Review of Systems:   CONSTITUTIONAL: No fever, weight loss, or fatigue  EYES: No eye pain, visual disturbances, or discharge  ENMT:  No difficulty hearing, tinnitus, vertigo; No sinus or throat pain  NECK: No pain or stiffness  BREASTS: No pain, masses, or nipple discharge  RESPIRATORY: No cough, wheezing, chills or hemoptysis; No shortness of breath  CARDIOVASCULAR: No chest pain, palpitations, dizziness, or leg swelling  GASTROINTESTINAL: No abdominal or epigastric pain. No nausea, vomiting, or hematemesis; No diarrhea or constipation. No melena or hematochezia.  GENITOURINARY: No dysuria, frequency, hematuria, or incontinence  NEUROLOGICAL: No headaches, memory loss, loss of strength, numbness, or tremors  SKIN: No itching, burning, rashes, or lesions   LYMPH NODES: No enlarged glands  ENDOCRINE: No heat or cold intolerance; No hair loss  MUSCULOSKELETAL: No joint pain or swelling; No muscle, back, or extremity pain  PSYCHIATRIC: No depression, anxiety, mood swings, or difficulty sleeping  HEME/LYMPH: No easy bruising, or bleeding gums  ALLERY AND IMMUNOLOGIC: No hives or eczema    Allergies    No Known Allergies    Intolerances        Social History:     FAMILY HISTORY:  No pertinent family history in first degree relatives        MEDICATIONS  (STANDING):  aspirin enteric coated 81 milliGRAM(s) Oral daily  clopidogrel Tablet 75 milliGRAM(s) Oral daily  dextrose 40% Gel 15 Gram(s) Oral once  dextrose 5%. 1000 milliLiter(s) (50 mL/Hr) IV Continuous <Continuous>  dextrose 5%. 1000 milliLiter(s) (100 mL/Hr) IV Continuous <Continuous>  dextrose 50% Injectable 25 Gram(s) IV Push once  dextrose 50% Injectable 12.5 Gram(s) IV Push once  dextrose 50% Injectable 25 Gram(s) IV Push once  enoxaparin Injectable 40 milliGRAM(s) SubCutaneous daily  escitalopram 10 milliGRAM(s) Oral daily  famotidine    Tablet 20 milliGRAM(s) Oral two times a day  folic acid 1 milliGRAM(s) Oral daily  glucagon  Injectable 1 milliGRAM(s) IntraMuscular once  insulin lispro (ADMELOG) corrective regimen sliding scale   SubCutaneous three times a day before meals  insulin lispro (ADMELOG) corrective regimen sliding scale   SubCutaneous at bedtime  losartan 100 milliGRAM(s) Oral daily  simvastatin 20 milliGRAM(s) Oral at bedtime  tamsulosin 0.4 milliGRAM(s) Oral at bedtime    MEDICATIONS  (PRN):        CAPILLARY BLOOD GLUCOSE      POCT Blood Glucose.: 199 mg/dL (02 Jun 2021 21:14)  POCT Blood Glucose.: 121 mg/dL (02 Jun 2021 17:24)  POCT Blood Glucose.: 118 mg/dL (02 Jun 2021 12:23)  POCT Blood Glucose.: 166 mg/dL (02 Jun 2021 06:46)    I&O's Summary    01 Jun 2021 07:01  -  02 Jun 2021 07:00  --------------------------------------------------------  IN: 200 mL / OUT: 0 mL / NET: 200 mL    02 Jun 2021 07:01  -  02 Jun 2021 23:27  --------------------------------------------------------  IN: 200 mL / OUT: 0 mL / NET: 200 mL        PHYSICAL EXAM:  GENERAL: NAD, well-developed  HEAD:  Atraumatic, Normocephalic  EYES: EOMI, PERRLA, conjunctiva and sclera clear  NECK: Supple, No JVD  CHEST/LUNG: Clear to auscultation bilaterally; No wheeze  HEART: Regular rate and rhythm; No murmurs, rubs, or gallops  ABDOMEN: Soft, Nontender, Nondistended; Bowel sounds present  EXTREMITIES:  2+ Peripheral Pulses, No clubbing, cyanosis, or edema  PSYCH: AAOx3  NEUROLOGY: non-focal  SKIN: No rashes or lesions    LABS:                        12.3   5.54  )-----------( 197      ( 02 Jun 2021 07:54 )             37.6     06-02    139  |  104  |  9   ----------------------------<  194<H>  3.4<L>   |  23  |  0.90    Ca    8.8      02 Jun 2021 07:54  Phos  2.5     06-02  Mg     2.1     06-02    TPro  6.9  /  Alb  3.8  /  TBili  0.5  /  DBili  x   /  AST  22  /  ALT  32  /  AlkPhos  70  06-02    PT/INR - ( 02 Jun 2021 07:54 )   PT: 12.5 sec;   INR: 1.09 ratio         PTT - ( 02 Jun 2021 07:54 )  PTT:30.2 sec          RADIOLOGY & ADDITIONAL TESTS:    Imaging Personally Reviewed:    Consultant(s) Notes Reviewed:      Care Discussed with Consultants/Other Providers:

## 2021-06-03 LAB
ANION GAP SERPL CALC-SCNC: 10 MMOL/L — SIGNIFICANT CHANGE UP (ref 7–14)
BUN SERPL-MCNC: 13 MG/DL — SIGNIFICANT CHANGE UP (ref 7–23)
CALCIUM SERPL-MCNC: 8.8 MG/DL — SIGNIFICANT CHANGE UP (ref 8.4–10.5)
CHLORIDE SERPL-SCNC: 101 MMOL/L — SIGNIFICANT CHANGE UP (ref 98–107)
CO2 SERPL-SCNC: 24 MMOL/L — SIGNIFICANT CHANGE UP (ref 22–31)
CREAT SERPL-MCNC: 1.03 MG/DL — SIGNIFICANT CHANGE UP (ref 0.5–1.3)
GLUCOSE SERPL-MCNC: 135 MG/DL — HIGH (ref 70–99)
HCT VFR BLD CALC: 37.4 % — LOW (ref 39–50)
HGB BLD-MCNC: 12.2 G/DL — LOW (ref 13–17)
MAGNESIUM SERPL-MCNC: 2.1 MG/DL — SIGNIFICANT CHANGE UP (ref 1.6–2.6)
MCHC RBC-ENTMCNC: 29.9 PG — SIGNIFICANT CHANGE UP (ref 27–34)
MCHC RBC-ENTMCNC: 32.6 GM/DL — SIGNIFICANT CHANGE UP (ref 32–36)
MCV RBC AUTO: 91.7 FL — SIGNIFICANT CHANGE UP (ref 80–100)
NRBC # BLD: 0 /100 WBCS — SIGNIFICANT CHANGE UP
NRBC # FLD: 0 K/UL — SIGNIFICANT CHANGE UP
PHOSPHATE SERPL-MCNC: 2.5 MG/DL — SIGNIFICANT CHANGE UP (ref 2.5–4.5)
PLATELET # BLD AUTO: 197 K/UL — SIGNIFICANT CHANGE UP (ref 150–400)
POTASSIUM SERPL-MCNC: 4.1 MMOL/L — SIGNIFICANT CHANGE UP (ref 3.5–5.3)
POTASSIUM SERPL-SCNC: 4.1 MMOL/L — SIGNIFICANT CHANGE UP (ref 3.5–5.3)
RBC # BLD: 4.08 M/UL — LOW (ref 4.2–5.8)
RBC # FLD: 13.8 % — SIGNIFICANT CHANGE UP (ref 10.3–14.5)
SODIUM SERPL-SCNC: 135 MMOL/L — SIGNIFICANT CHANGE UP (ref 135–145)
WBC # BLD: 5.63 K/UL — SIGNIFICANT CHANGE UP (ref 3.8–10.5)
WBC # FLD AUTO: 5.63 K/UL — SIGNIFICANT CHANGE UP (ref 3.8–10.5)

## 2021-06-03 PROCEDURE — 93458 L HRT ARTERY/VENTRICLE ANGIO: CPT | Mod: 26,59

## 2021-06-03 PROCEDURE — 92928 PRQ TCAT PLMT NTRAC ST 1 LES: CPT | Mod: LC

## 2021-06-03 RX ORDER — ASPIRIN/CALCIUM CARB/MAGNESIUM 324 MG
325 TABLET ORAL DAILY
Refills: 0 | Status: DISCONTINUED | OUTPATIENT
Start: 2021-06-04 | End: 2021-06-04

## 2021-06-03 RX ADMIN — FAMOTIDINE 20 MILLIGRAM(S): 10 INJECTION INTRAVENOUS at 22:14

## 2021-06-03 RX ADMIN — Medication 1 MILLIGRAM(S): at 11:52

## 2021-06-03 RX ADMIN — Medication 81 MILLIGRAM(S): at 11:52

## 2021-06-03 RX ADMIN — FAMOTIDINE 20 MILLIGRAM(S): 10 INJECTION INTRAVENOUS at 05:43

## 2021-06-03 RX ADMIN — SIMVASTATIN 20 MILLIGRAM(S): 20 TABLET, FILM COATED ORAL at 22:14

## 2021-06-03 RX ADMIN — CLOPIDOGREL BISULFATE 75 MILLIGRAM(S): 75 TABLET, FILM COATED ORAL at 11:52

## 2021-06-03 RX ADMIN — TAMSULOSIN HYDROCHLORIDE 0.4 MILLIGRAM(S): 0.4 CAPSULE ORAL at 22:14

## 2021-06-03 RX ADMIN — Medication 75 MILLIGRAM(S): at 05:43

## 2021-06-03 RX ADMIN — LOSARTAN POTASSIUM 100 MILLIGRAM(S): 100 TABLET, FILM COATED ORAL at 05:43

## 2021-06-03 RX ADMIN — ENOXAPARIN SODIUM 40 MILLIGRAM(S): 100 INJECTION SUBCUTANEOUS at 11:52

## 2021-06-03 NOTE — PROGRESS NOTE ADULT - SUBJECTIVE AND OBJECTIVE BOX
Barrett Yee MD  Interventional Cardiology / Advance Heart Failure and Cardiac Transplant Specialist  [place] Office : [address] [place] [address] [place]. [address]  Tel:   [place] Office : [address] [facility] [place] [address]  Tel: [phone]  Cell : [phone]    Subjective/Overnight events: Pt is lying in bed comfortable not in distress, no chest pains no SOB no palpitations  	  MEDICATIONS:  aspirin enteric coated 81 milliGRAM(s) Oral daily  clopidogrel Tablet 75 milliGRAM(s) Oral daily  enoxaparin Injectable 40 milliGRAM(s) SubCutaneous daily  losartan 100 milliGRAM(s) Oral daily  tamsulosin 0.4 milliGRAM(s) Oral at bedtime        escitalopram 10 milliGRAM(s) Oral daily    famotidine    Tablet 20 milliGRAM(s) Oral two times a day    dextrose 40% Gel 15 Gram(s) Oral once  dextrose 50% Injectable 25 Gram(s) IV Push once  dextrose 50% Injectable 12.5 Gram(s) IV Push once  dextrose 50% Injectable 25 Gram(s) IV Push once  glucagon  Injectable 1 milliGRAM(s) IntraMuscular once  insulin lispro (ADMELOG) corrective regimen sliding scale   SubCutaneous three times a day before meals  insulin lispro (ADMELOG) corrective regimen sliding scale   SubCutaneous at bedtime  simvastatin 20 milliGRAM(s) Oral at bedtime    dextrose 5%. 1000 milliLiter(s) IV Continuous <Continuous>  dextrose 5%. 1000 milliLiter(s) IV Continuous <Continuous>  folic acid 1 milliGRAM(s) Oral daily      PAST MEDICAL/SURGICAL HISTORY  PAST MEDICAL & SURGICAL HISTORY:  Hypertension, unspecified type    Hyperlipidemia, unspecified hyperlipidemia type    Coronary artery disease, angina presence unspecified, unspecified vessel or lesion type, unspecified whether native or transplanted heart  s/p stents    No significant past surgical history        SOCIAL HISTORY: Substance Use (street drugs): ( x ) never used  (  ) other:    FAMILY HISTORY:  No pertinent family history in first degree relatives        REVIEW OF SYSTEMS:  CONSTITUTIONAL: No fever, weight loss, or fatigue  EYES: No eye pain, visual disturbances, or discharge  ENMT:  No difficulty hearing, tinnitus, vertigo; No sinus or throat pain  BREASTS: No pain, masses, or nipple discharge  GASTROINTESTINAL: No abdominal or epigastric pain. No nausea, vomiting, or hematemesis; No diarrhea or constipation. No melena or hematochezia.  GENITOURINARY: No dysuria, frequency, hematuria, or incontinence  NEUROLOGICAL: No headaches, memory loss, loss of strength, numbness, or tremors  ENDOCRINE: No heat or cold intolerance; No hair loss  MUSCULOSKELETAL: No joint pain or swelling; No muscle, back, or extremity pain  PSYCHIATRIC: No depression, anxiety, mood swings, or difficulty sleeping  HEME/LYMPH: No easy bruising, or bleeding gums  All others negative    PHYSICAL EXAM:  T(C): 36.7 (06-03-21 @ 10:06), Max: 36.7 (06-03-21 @ 05:40)  HR: 52 (06-03-21 @ 11:40) (52 - 62)  BP: 155/81 (06-03-21 @ 11:40) (133/81 - 160/82)  RR: 18 (06-03-21 @ 10:06) (17 - 18)  SpO2: 99% (06-03-21 @ 10:06) (99% - 100%)  Wt(kg): --  I&O's Summary    02 Jun 2021 07:01  -  03 Jun 2021 07:00  --------------------------------------------------------  IN: 200 mL / OUT: 0 mL / NET: 200 mL          GENERAL: NAD  EYES: EOMI, PERRLA, conjunctiva and sclera clear  ENMT: No tonsillar erythema, exudates, or enlargement  Cardiovascular: Normal S1 S2, No JVD, No murmurs, No edema  Respiratory: Lungs clear to auscultation	  Gastrointestinal:  Soft, Non-tender, + BS	  Extremities: No edema  NERVOUS SYSTEM:  Alert & Oriented X3                                    12.2   5.63  )-----------( 197      ( 03 Jun 2021 07:50 )             37.4     06-03    135  |  101  |  13  ----------------------------<  135<H>  4.1   |  24  |  1.03    Ca    8.8      03 Jun 2021 07:50  Phos  2.5     06-03  Mg     2.1     06-03    TPro  6.9  /  Alb  3.8  /  TBili  0.5  /  DBili  x   /  AST  22  /  ALT  32  /  AlkPhos  70  06-02    proBNP:   Lipid Profile:   HgA1c:   TSH:     Consultant(s) Notes Reviewed:  [x ] YES  [ ] NO    Care Discussed with Consultants/Other Providers [ x] YES  [ ] NO    Imaging Personally Reviewed independently:  [x] YES  [ ] NO    All labs, radiologic studies, vitals, orders and medications list reviewed. Patient is seen and examined at bedside. Case discussed with medical team.

## 2021-06-03 NOTE — PROGRESS NOTE ADULT - ASSESSMENT
Assessment and Plan    66 y/o M PMH HTN, HLD, DM2, CAD s/p stents 2015 (on ASA/Plavix) p/w 2 months of worsening chest pain/exertional pressure     EKG: NSR no STT changes   Tele: SB 40-50s    1. Chest pain  -exertional   -trops 9-10  -CXR clear  -echo pending  -hx of CAD with stents in 2015, has not followed up with cardiology since  -plan for cath today    2. CAD s/p stents  -2 stents in 2015 as per patient  -c/w asa, plavix and lipitor     3. HTN  -controlled  -continue with metoprolol and losartan  -metoprolol discontinued 2/2 bradycardia  -continue to monitor BP    4. DVT prophylaxis  -lovenox subq

## 2021-06-04 ENCOUNTER — TRANSCRIPTION ENCOUNTER (OUTPATIENT)
Age: 66
End: 2021-06-04

## 2021-06-04 VITALS
SYSTOLIC BLOOD PRESSURE: 140 MMHG | HEART RATE: 70 BPM | OXYGEN SATURATION: 100 % | DIASTOLIC BLOOD PRESSURE: 67 MMHG | TEMPERATURE: 99 F | RESPIRATION RATE: 16 BRPM

## 2021-06-04 LAB
ANION GAP SERPL CALC-SCNC: 15 MMOL/L — HIGH (ref 7–14)
BUN SERPL-MCNC: 12 MG/DL — SIGNIFICANT CHANGE UP (ref 7–23)
CALCIUM SERPL-MCNC: 8.9 MG/DL — SIGNIFICANT CHANGE UP (ref 8.4–10.5)
CHLORIDE SERPL-SCNC: 102 MMOL/L — SIGNIFICANT CHANGE UP (ref 98–107)
CO2 SERPL-SCNC: 22 MMOL/L — SIGNIFICANT CHANGE UP (ref 22–31)
CREAT SERPL-MCNC: 1.07 MG/DL — SIGNIFICANT CHANGE UP (ref 0.5–1.3)
GLUCOSE SERPL-MCNC: 184 MG/DL — HIGH (ref 70–99)
HCT VFR BLD CALC: 40.7 % — SIGNIFICANT CHANGE UP (ref 39–50)
HGB BLD-MCNC: 13.5 G/DL — SIGNIFICANT CHANGE UP (ref 13–17)
MAGNESIUM SERPL-MCNC: 2.1 MG/DL — SIGNIFICANT CHANGE UP (ref 1.6–2.6)
MCHC RBC-ENTMCNC: 29.9 PG — SIGNIFICANT CHANGE UP (ref 27–34)
MCHC RBC-ENTMCNC: 33.2 GM/DL — SIGNIFICANT CHANGE UP (ref 32–36)
MCV RBC AUTO: 90 FL — SIGNIFICANT CHANGE UP (ref 80–100)
NRBC # BLD: 0 /100 WBCS — SIGNIFICANT CHANGE UP
NRBC # FLD: 0 K/UL — SIGNIFICANT CHANGE UP
PHOSPHATE SERPL-MCNC: 2.9 MG/DL — SIGNIFICANT CHANGE UP (ref 2.5–4.5)
PLATELET # BLD AUTO: 216 K/UL — SIGNIFICANT CHANGE UP (ref 150–400)
POTASSIUM SERPL-MCNC: 3.3 MMOL/L — LOW (ref 3.5–5.3)
POTASSIUM SERPL-SCNC: 3.3 MMOL/L — LOW (ref 3.5–5.3)
RBC # BLD: 4.52 M/UL — SIGNIFICANT CHANGE UP (ref 4.2–5.8)
RBC # FLD: 13.8 % — SIGNIFICANT CHANGE UP (ref 10.3–14.5)
SODIUM SERPL-SCNC: 139 MMOL/L — SIGNIFICANT CHANGE UP (ref 135–145)
WBC # BLD: 7.73 K/UL — SIGNIFICANT CHANGE UP (ref 3.8–10.5)
WBC # FLD AUTO: 7.73 K/UL — SIGNIFICANT CHANGE UP (ref 3.8–10.5)

## 2021-06-04 PROCEDURE — 93306 TTE W/DOPPLER COMPLETE: CPT | Mod: 26

## 2021-06-04 RX ORDER — METOPROLOL TARTRATE 50 MG
1 TABLET ORAL
Qty: 30 | Refills: 0
Start: 2021-06-04 | End: 2021-07-03

## 2021-06-04 RX ORDER — SIMVASTATIN 20 MG/1
1 TABLET, FILM COATED ORAL
Qty: 30 | Refills: 0
Start: 2021-06-04 | End: 2021-07-03

## 2021-06-04 RX ORDER — METOPROLOL TARTRATE 50 MG
1 TABLET ORAL
Qty: 0 | Refills: 0 | DISCHARGE

## 2021-06-04 RX ORDER — LOSARTAN POTASSIUM 100 MG/1
1 TABLET, FILM COATED ORAL
Qty: 0 | Refills: 0 | DISCHARGE

## 2021-06-04 RX ORDER — METFORMIN HYDROCHLORIDE 850 MG/1
1 TABLET ORAL
Qty: 60 | Refills: 0
Start: 2021-06-04 | End: 2021-07-03

## 2021-06-04 RX ORDER — FOLIC ACID 0.8 MG
1 TABLET ORAL
Qty: 0 | Refills: 0 | DISCHARGE

## 2021-06-04 RX ORDER — LOSARTAN POTASSIUM 100 MG/1
1 TABLET, FILM COATED ORAL
Qty: 30 | Refills: 0
Start: 2021-06-04 | End: 2021-07-03

## 2021-06-04 RX ORDER — FOLIC ACID 0.8 MG
1 TABLET ORAL
Qty: 30 | Refills: 0
Start: 2021-06-04 | End: 2021-07-03

## 2021-06-04 RX ORDER — ESCITALOPRAM OXALATE 10 MG/1
1 TABLET, FILM COATED ORAL
Qty: 0 | Refills: 0 | DISCHARGE

## 2021-06-04 RX ORDER — TAMSULOSIN HYDROCHLORIDE 0.4 MG/1
1 CAPSULE ORAL
Qty: 30 | Refills: 0
Start: 2021-06-04 | End: 2021-07-03

## 2021-06-04 RX ORDER — ASPIRIN/CALCIUM CARB/MAGNESIUM 324 MG
1 TABLET ORAL
Qty: 0 | Refills: 0 | DISCHARGE

## 2021-06-04 RX ORDER — FAMOTIDINE 10 MG/ML
1 INJECTION INTRAVENOUS
Qty: 60 | Refills: 0
Start: 2021-06-04 | End: 2021-07-03

## 2021-06-04 RX ORDER — FAMOTIDINE 10 MG/ML
1 INJECTION INTRAVENOUS
Qty: 0 | Refills: 0 | DISCHARGE

## 2021-06-04 RX ORDER — TAMSULOSIN HYDROCHLORIDE 0.4 MG/1
1 CAPSULE ORAL
Qty: 0 | Refills: 0 | DISCHARGE

## 2021-06-04 RX ORDER — METFORMIN HYDROCHLORIDE 850 MG/1
1 TABLET ORAL
Qty: 0 | Refills: 0 | DISCHARGE

## 2021-06-04 RX ORDER — POTASSIUM CHLORIDE 20 MEQ
40 PACKET (EA) ORAL EVERY 4 HOURS
Refills: 0 | Status: COMPLETED | OUTPATIENT
Start: 2021-06-04 | End: 2021-06-04

## 2021-06-04 RX ORDER — ASPIRIN/CALCIUM CARB/MAGNESIUM 324 MG
1 TABLET ORAL
Qty: 0 | Refills: 0 | DISCHARGE
Start: 2021-06-04

## 2021-06-04 RX ORDER — CLOPIDOGREL BISULFATE 75 MG/1
1 TABLET, FILM COATED ORAL
Qty: 0 | Refills: 0 | DISCHARGE

## 2021-06-04 RX ORDER — METOPROLOL TARTRATE 50 MG
25 TABLET ORAL DAILY
Refills: 0 | Status: DISCONTINUED | OUTPATIENT
Start: 2021-06-04 | End: 2021-06-04

## 2021-06-04 RX ORDER — CLOPIDOGREL BISULFATE 75 MG/1
1 TABLET, FILM COATED ORAL
Qty: 30 | Refills: 0
Start: 2021-06-04 | End: 2021-07-03

## 2021-06-04 RX ORDER — DOCUSATE SODIUM 100 MG
1 CAPSULE ORAL
Qty: 0 | Refills: 0 | DISCHARGE

## 2021-06-04 RX ORDER — ASPIRIN/CALCIUM CARB/MAGNESIUM 324 MG
1 TABLET ORAL
Qty: 30 | Refills: 0
Start: 2021-06-04 | End: 2021-07-03

## 2021-06-04 RX ORDER — SIMVASTATIN 20 MG/1
1 TABLET, FILM COATED ORAL
Qty: 0 | Refills: 0 | DISCHARGE

## 2021-06-04 RX ADMIN — Medication 2: at 12:46

## 2021-06-04 RX ADMIN — Medication 40 MILLIEQUIVALENT(S): at 12:48

## 2021-06-04 RX ADMIN — Medication 1 MILLIGRAM(S): at 12:48

## 2021-06-04 RX ADMIN — Medication 40 MILLIEQUIVALENT(S): at 16:16

## 2021-06-04 RX ADMIN — FAMOTIDINE 20 MILLIGRAM(S): 10 INJECTION INTRAVENOUS at 06:09

## 2021-06-04 RX ADMIN — CLOPIDOGREL BISULFATE 75 MILLIGRAM(S): 75 TABLET, FILM COATED ORAL at 12:45

## 2021-06-04 RX ADMIN — LOSARTAN POTASSIUM 100 MILLIGRAM(S): 100 TABLET, FILM COATED ORAL at 06:09

## 2021-06-04 RX ADMIN — Medication 325 MILLIGRAM(S): at 12:45

## 2021-06-04 RX ADMIN — ENOXAPARIN SODIUM 40 MILLIGRAM(S): 100 INJECTION SUBCUTANEOUS at 12:45

## 2021-06-04 NOTE — DISCHARGE NOTE PROVIDER - NSDCMRMEDTOKEN_GEN_ALL_CORE_FT
Aspirin Enteric Coated 81 mg oral delayed release tablet: 1 tab(s) orally once a day  clopidogrel 75 mg oral tablet: 1 tab(s) orally once a day  famotidine 20 mg oral tablet: 1 tab(s) orally 2 times a day  folic acid 1 mg oral tablet: 1 tab(s) orally once a day  losartan 100 mg oral tablet: 1 tab(s) orally once a day  metFORMIN 1000 mg oral tablet: 1 tab(s) orally 2 times a day  Restart taking 6/6  metoprolol succinate 100 mg oral tablet, extended release: 1 tab(s) orally once a day  simvastatin 20 mg oral tablet: 1 tab(s) orally once a day (at bedtime)  tamsulosin 0.4 mg oral capsule: 1 cap(s) orally once a day   aspirin 81 mg oral delayed release tablet: 1 tab(s) orally once a day  clopidogrel 75 mg oral tablet: 1 tab(s) orally once a day  famotidine 20 mg oral tablet: 1 tab(s) orally 2 times a day  folic acid 1 mg oral tablet: 1 tab(s) orally once a day  losartan 100 mg oral tablet: 1 tab(s) orally once a day  metFORMIN 1000 mg oral tablet: 1 tab(s) orally 2 times a day  Restart taking 6/6  metoprolol succinate 25 mg oral tablet, extended release: 1 tab(s) orally once a day  simvastatin 20 mg oral tablet: 1 tab(s) orally once a day (at bedtime)  tamsulosin 0.4 mg oral capsule: 1 cap(s) orally once a day   aspirin 81 mg oral delayed release tablet: 1 tab(s) orally once a day  famotidine 20 mg oral tablet: 1 tab(s) orally 2 times a day  folic acid 1 mg oral tablet: 1 tab(s) orally once a day  losartan 100 mg oral tablet: 1 tab(s) orally once a day  metFORMIN 1000 mg oral tablet: 1 tab(s) orally 2 times a day  Restart taking 6/6  metoprolol succinate 25 mg oral tablet, extended release: 1 tab(s) orally once a day  simvastatin 20 mg oral tablet: 1 tab(s) orally once a day (at bedtime)  tamsulosin 0.4 mg oral capsule: 1 cap(s) orally once a day

## 2021-06-04 NOTE — DISCHARGE NOTE PROVIDER - PROVIDER TOKENS
FREE:[LAST:[Dr. Scott],PHONE:[(   )    -],FAX:[(   )    -]] PROVIDER:[TOKEN:[[id number]]],FREE:[LAST:[Dr. Scott],PHONE:[(   )    -],FAX:[(   )    -]],PROVIDER:[TOKEN:[[id number]]]

## 2021-06-04 NOTE — DISCHARGE NOTE NURSING/CASE MANAGEMENT/SOCIAL WORK - PATIENT PORTAL LINK FT
You can access the FollowMyHealth Patient Portal offered by [facility] by registering at the following website: [email or web address]. By joining [employer]’s FollowMyHealth portal, you will also be able to view your health information using other applications (apps) compatible with our system.

## 2021-06-04 NOTE — DISCHARGE NOTE PROVIDER - CARE PROVIDERS DIRECT ADDRESSES
,DirectAddress_Unknown ,DirectAddress_Unknown,DirectAddress_Unknown,[email or web address]

## 2021-06-04 NOTE — CHART NOTE - NSCHARTNOTEFT_GEN_A_CORE
Patient TTE results reviewed with Dr. Yee.  Discharge medication reviewed with Dr. Guthrie - patient to be discharge on asa 81mg po daily, plavix 75m po daily and Toprol XL 25mg po daily.
Patient is s/p cardiac cath / PCI.   Patient without any complaints.   Right femoral artery access site dressing intact, clean / dry,  no hematoma, no active bleed, distal pulses intact, Lower extremity warm to touch.     Vital Signs Last 24 Hrs  T(C): 36.3 (03 Jun 2021 22:43), Max: 36.7 (03 Jun 2021 05:40)  T(F): 97.3 (03 Jun 2021 22:43), Max: 98.1 (03 Jun 2021 10:06)  HR: 67 (03 Jun 2021 22:43) (52 - 67)  BP: 160/84 (03 Jun 2021 22:43) (152/77 - 161/74)  RR: 16 (03 Jun 2021 22:43) (16 - 18)  SpO2: 100% (03 Jun 2021 22:43) (99% - 100%)
patient states he does not take lexapro at home anymore will d/c

## 2021-06-04 NOTE — DISCHARGE NOTE PROVIDER - HOSPITAL COURSE
65 M PMH HTN, HLD, DM2, CAD s/p stents 2015 (on ASA/Plavix) p/w chest pain      6/3 LHC:  apical LAD 70%, D1 stent patent, pLCx 95% x1 NIURKA, mLCx stent patent, RCA normal, EF 55%, LVEDP 17, RFA accessed   65 M PMH HTN, HLD, DM2, CAD s/p stents 2015 (on ASA/Plavix) p/w chest pain    6/3 LHC:  apical LAD 70%, D1 stent patent, pLCx 95% x1 NIURKA, mLCx stent patent, RCA normal, EF 55%, LVEDP 17, RFA accessed  Echo done 6/4: 1. Mitral annular calcification, otherwise normal mitral valve. Minimal mitral regurgitation. 2. Normal left ventricular internal dimensions and wall thicknesses. 3. Normal left ventricular systolic function. No segmental wall motion abnormalities. 4. Mild diastolic dysfunction (Stage I). 5. Normal right ventricular size and function.    Cleared for discharge as per Dr. Guthrie -   per Dr. Love - medication rec reviewed - discharge on Toprol XL 25mg po daily and ASA 81mg po daily with plavix 75 mg to continue 65 M PMH HTN, HLD, DM2, CAD s/p stents 2015 (on ASA/Plavix) p/w chest pain    6/3 LHC:  apical LAD 70%, D1 stent patent, pLCx 95% x1 NIURKA, mLCx stent patent, RCA normal, EF 55%, LVEDP 17, RFA accessed  Echo done 6/4: 1. Mitral annular calcification, otherwise normal mitral valve. Minimal mitral regurgitation. 2. Normal left ventricular internal dimensions and wall thicknesses. 3. Normal left ventricular systolic function. No segmental wall motion abnormalities. 4. Mild diastolic dysfunction (Stage I). 5. Normal right ventricular size and function.    Cleared for discharge as per Dr. Guthrie -   per Dr. Yee - medication rec reviewed - discharge on Toprol XL 25mg po daily and ASA 81mg po daily with Plavix 75 mg to continue

## 2021-06-04 NOTE — DISCHARGE NOTE PROVIDER - CARE PROVIDER_API CALL
Dr. Scott,   Phone: (   )    -  Fax: (   )    -  Follow Up Time:    Barrett Yee  CARDIOVASCULAR DISEASE  [address] [place] [address]  Phone: [phone]  Fax: [phone]  Follow Up Time:     Dr. Scott,   Phone: (   )    -  Fax: (   )    -  Follow Up Time:     Piedad Leon)  Cardiology; Internal Medicine  [address] [place] [address], [address]  [place], [place] [address]  Phone: [phone]  Fax: [phone]  Follow Up Time:

## 2021-06-04 NOTE — DISCHARGE NOTE PROVIDER - NSDCCPCAREPLAN_GEN_ALL_CORE_FT
PRINCIPAL DISCHARGE DIAGNOSIS  Diagnosis: Chest pain  Assessment and Plan of Treatment: You were evaluated by a cardiologist.  You underwent a cardiac catherization and had a stent placed.   Continue taking medications as directed - aspirin and plavix      SECONDARY DISCHARGE DIAGNOSES  Diagnosis: Essential hypertension  Assessment and Plan of Treatment: Continue blood pressure medication regimen as directed. Monitor for any visual changes, headaches or dizziness.  Monitor blood pressure regularly.  Follow up with your PCP for further management for high blood pressure.      Diagnosis: Diabetes mellitus type 2, noninsulin dependent  Assessment and Plan of Treatment: Continue consistent carbohydrate diet.  Monitor blood glucose levels throughout the day before meals and at bedtime.  Record blood sugars and bring to outpatient providers appointment in order to be reviewed by your doctor for management modifications.  Be aware of diabetes management symptoms including feeling cool and clammy may be related to low glucose levels.  Feeling hot and dry may indicate high glucose levels.  If  you feel these symptoms, check your blood sugar.  Make regular podiatry appointments in order to have feet checked for wounds and toe nails cut by a doctor to prevent infections.

## 2021-07-14 ENCOUNTER — EMERGENCY (EMERGENCY)
Facility: HOSPITAL | Age: 66
LOS: 1 days | Discharge: HOME | End: 2021-07-14
Attending: EMERGENCY MEDICINE | Admitting: EMERGENCY MEDICINE
Payer: MEDICAID

## 2021-07-14 VITALS
OXYGEN SATURATION: 99 % | HEIGHT: 66 IN | TEMPERATURE: 98 F | RESPIRATION RATE: 16 BRPM | HEART RATE: 64 BPM | SYSTOLIC BLOOD PRESSURE: 158 MMHG | DIASTOLIC BLOOD PRESSURE: 86 MMHG

## 2021-07-14 PROCEDURE — 99283 EMERGENCY DEPT VISIT LOW MDM: CPT

## 2021-07-14 RX ORDER — HYDROCHLOROTHIAZIDE 25 MG
1 TABLET ORAL
Qty: 90 | Refills: 0
Start: 2021-07-14 | End: 2021-10-11

## 2021-07-14 RX ORDER — POTASSIUM CHLORIDE 20 MEQ
1 PACKET (EA) ORAL
Qty: 90 | Refills: 0
Start: 2021-07-14 | End: 2021-10-11

## 2021-07-14 RX ORDER — CLOPIDOGREL BISULFATE 75 MG/1
1 TABLET, FILM COATED ORAL
Qty: 90 | Refills: 0
Start: 2021-07-14 | End: 2021-10-11

## 2021-07-14 RX ORDER — AMLODIPINE BESYLATE 2.5 MG/1
1 TABLET ORAL
Qty: 180 | Refills: 0
Start: 2021-07-14 | End: 2021-10-11

## 2021-07-14 RX ORDER — METFORMIN HYDROCHLORIDE 850 MG/1
1 TABLET ORAL
Qty: 180 | Refills: 0
Start: 2021-07-14 | End: 2021-10-11

## 2021-07-14 RX ORDER — LOSARTAN POTASSIUM 100 MG/1
1 TABLET, FILM COATED ORAL
Qty: 90 | Refills: 0
Start: 2021-07-14 | End: 2021-10-11

## 2021-07-14 RX ORDER — TAMSULOSIN HYDROCHLORIDE 0.4 MG/1
1 CAPSULE ORAL
Qty: 90 | Refills: 0
Start: 2021-07-14 | End: 2021-10-11

## 2021-07-14 RX ORDER — METOPROLOL TARTRATE 50 MG
1 TABLET ORAL
Qty: 180 | Refills: 0
Start: 2021-07-14 | End: 2021-10-11

## 2021-07-14 RX ORDER — SIMVASTATIN 20 MG/1
1 TABLET, FILM COATED ORAL
Qty: 90 | Refills: 0
Start: 2021-07-14 | End: 2021-10-11

## 2021-07-14 RX ORDER — FAMOTIDINE 10 MG/ML
1 INJECTION INTRAVENOUS
Qty: 180 | Refills: 0
Start: 2021-07-14 | End: 2021-10-11

## 2021-07-14 RX ORDER — ASPIRIN/CALCIUM CARB/MAGNESIUM 324 MG
1 TABLET ORAL
Qty: 90 | Refills: 0
Start: 2021-07-14 | End: 2021-10-11

## 2021-07-14 RX ORDER — AMLODIPINE BESYLATE 2.5 MG/1
1 TABLET ORAL
Qty: 180 | Refills: 0 | DISCHARGE
Start: 2021-07-14 | End: 2021-10-11

## 2021-07-14 RX ORDER — FOLIC ACID 0.8 MG
1 TABLET ORAL
Qty: 90 | Refills: 0
Start: 2021-07-14 | End: 2021-10-11

## 2021-07-14 NOTE — ED PROVIDER NOTE - OBJECTIVE STATEMENT
Guillermo: HTN, DM, CAD (stents) placed last month, normal EF. P/w persistent HTN (-180) despite home meds. Is also taking Novasc 5 mg BID. No CP/SOB/neuro deficits. Is not on a diuretic, perhaps b/c K was ~3.5.    HTN meds:  Losartan 100 mg  Norvac 5 mg BID  MTP 25 mg BID  Flomax 0.4 mg

## 2021-07-14 NOTE — ED PROVIDER NOTE - PATIENT PORTAL LINK FT
You can access the FollowMyHealth Patient Portal offered by [facility] by registering at the following website: [email or web address]. By joining [employer]’s FollowMyHealth portal, you will also be able to view your health information using other applications (apps) compatible with our system.

## 2021-07-14 NOTE — ED ADULT TRIAGE NOTE - CHIEF COMPLAINT QUOTE
Pt c/o HTN states his doctor put him on medications 1 month ago, but it is still high. +constant headache denies visual changes. PMH: DM2, heart disease

## 2021-07-14 NOTE — ED PROVIDER NOTE - CLINICAL SUMMARY MEDICAL DECISION MAKING FREE TEXT BOX
Guillermo: HTN despite taking his meds reliably (didn't take them yet this AM; will have him do so). Will prescribe HCTZ 25 mg and potassium supplement. Follow w/ PCP or return to ED in 7-10 days for Na, K, Cr re-check.

## 2021-07-14 NOTE — ED PROVIDER NOTE - NSFOLLOWUPINSTRUCTIONS_ED_ALL_ED_FT
Take medications as prescribed. Start HCTZ and potassium today.    See PCP or return to ED in 7-10 to check sodium, potassium, and kidney function.     Dr. Li is in the ER:  Wed., July 21 from 7 AM - 3 PM  Thurs., July 22 from 7 AM - 3 PM  Fri., July 23 from 3 PM - 11 PM    Return to the ED sooner if you develop chest pain or shortness of breath or focal neurologic symptoms (such as weakness or numbness in an arm or leg) or slurred speech.

## 2021-08-03 NOTE — ED POST DISCHARGE NOTE - REASON FOR FOLLOW-UP
Other Pharmacist called asking if metoprolol succinate 25mg  extended tabs should be BID as prescribed or once a day . Contacted Dr Li who agreed with once a day. Called pharmacy  and spoke with pharmacist who switched rx to once a day.

## 2021-11-05 NOTE — ED ADULT NURSE NOTE - CAS EDN DISCHARGE INTERVENTIONS
FAMILY HISTORY:  Father  Still living? No  FH: heart attack, Age at diagnosis: Age Unknown    Mother  Still living? Unknown  FH: heart attack, Age at diagnosis: Age Unknown    
IV discontinued, cath removed intact

## 2023-04-04 ENCOUNTER — INPATIENT (INPATIENT)
Facility: HOSPITAL | Age: 68
LOS: 4 days | Discharge: HOME HEALTH | End: 2023-04-09
Attending: HOSPITALIST | Admitting: HOSPITALIST
Payer: MEDICARE

## 2023-04-04 VITALS
HEART RATE: 70 BPM | DIASTOLIC BLOOD PRESSURE: 63 MMHG | TEMPERATURE: 98 F | OXYGEN SATURATION: 97 % | SYSTOLIC BLOOD PRESSURE: 125 MMHG | RESPIRATION RATE: 16 BRPM

## 2023-04-04 PROCEDURE — 99285 EMERGENCY DEPT VISIT HI MDM: CPT

## 2023-04-04 RX ORDER — SODIUM CHLORIDE 9 MG/ML
1000 INJECTION INTRAMUSCULAR; INTRAVENOUS; SUBCUTANEOUS ONCE
Refills: 0 | Status: COMPLETED | OUTPATIENT
Start: 2023-04-04 | End: 2023-04-04

## 2023-04-04 RX ORDER — PANTOPRAZOLE SODIUM 20 MG/1
80 TABLET, DELAYED RELEASE ORAL ONCE
Refills: 0 | Status: COMPLETED | OUTPATIENT
Start: 2023-04-04 | End: 2023-04-04

## 2023-04-04 NOTE — ED ADULT TRIAGE NOTE - CHIEF COMPLAINT QUOTE
Pt c/o epigastric pain, dizziness and constipation "black stools" x5 days. Pt on daily ASA. PMH CAD 3x stents, HTN, HLD, DM2

## 2023-04-05 DIAGNOSIS — K92.1 MELENA: ICD-10-CM

## 2023-04-05 DIAGNOSIS — I25.10 ATHEROSCLEROTIC HEART DISEASE OF NATIVE CORONARY ARTERY WITHOUT ANGINA PECTORIS: ICD-10-CM

## 2023-04-05 DIAGNOSIS — Z29.9 ENCOUNTER FOR PROPHYLACTIC MEASURES, UNSPECIFIED: ICD-10-CM

## 2023-04-05 DIAGNOSIS — E11.9 TYPE 2 DIABETES MELLITUS WITHOUT COMPLICATIONS: ICD-10-CM

## 2023-04-05 DIAGNOSIS — R19.5 OTHER FECAL ABNORMALITIES: ICD-10-CM

## 2023-04-05 DIAGNOSIS — I10 ESSENTIAL (PRIMARY) HYPERTENSION: ICD-10-CM

## 2023-04-05 LAB
ALBUMIN SERPL ELPH-MCNC: 3.7 G/DL — SIGNIFICANT CHANGE UP (ref 3.3–5)
ALBUMIN SERPL ELPH-MCNC: 3.7 G/DL — SIGNIFICANT CHANGE UP (ref 3.3–5)
ALBUMIN SERPL ELPH-MCNC: 4 G/DL — SIGNIFICANT CHANGE UP (ref 3.3–5)
ALP SERPL-CCNC: 46 U/L — SIGNIFICANT CHANGE UP (ref 40–120)
ALP SERPL-CCNC: 49 U/L — SIGNIFICANT CHANGE UP (ref 40–120)
ALP SERPL-CCNC: 50 U/L — SIGNIFICANT CHANGE UP (ref 40–120)
ALT FLD-CCNC: 26 U/L — SIGNIFICANT CHANGE UP (ref 4–41)
ALT FLD-CCNC: 26 U/L — SIGNIFICANT CHANGE UP (ref 4–41)
ALT FLD-CCNC: 27 U/L — SIGNIFICANT CHANGE UP (ref 4–41)
ANION GAP SERPL CALC-SCNC: 10 MMOL/L — SIGNIFICANT CHANGE UP (ref 7–14)
ANION GAP SERPL CALC-SCNC: 11 MMOL/L — SIGNIFICANT CHANGE UP (ref 7–14)
ANION GAP SERPL CALC-SCNC: 11 MMOL/L — SIGNIFICANT CHANGE UP (ref 7–14)
APTT BLD: 29.3 SEC — SIGNIFICANT CHANGE UP (ref 27–36.3)
AST SERPL-CCNC: 18 U/L — SIGNIFICANT CHANGE UP (ref 4–40)
AST SERPL-CCNC: 19 U/L — SIGNIFICANT CHANGE UP (ref 4–40)
AST SERPL-CCNC: 22 U/L — SIGNIFICANT CHANGE UP (ref 4–40)
BASE EXCESS BLDV CALC-SCNC: 1.6 MMOL/L — SIGNIFICANT CHANGE UP (ref -2–3)
BASOPHILS # BLD AUTO: 0.02 K/UL — SIGNIFICANT CHANGE UP (ref 0–0.2)
BASOPHILS # BLD AUTO: 0.03 K/UL — SIGNIFICANT CHANGE UP (ref 0–0.2)
BASOPHILS NFR BLD AUTO: 0.3 % — SIGNIFICANT CHANGE UP (ref 0–2)
BASOPHILS NFR BLD AUTO: 0.5 % — SIGNIFICANT CHANGE UP (ref 0–2)
BILIRUB SERPL-MCNC: 0.4 MG/DL — SIGNIFICANT CHANGE UP (ref 0.2–1.2)
BILIRUB SERPL-MCNC: 0.5 MG/DL — SIGNIFICANT CHANGE UP (ref 0.2–1.2)
BILIRUB SERPL-MCNC: 0.6 MG/DL — SIGNIFICANT CHANGE UP (ref 0.2–1.2)
BLD GP AB SCN SERPL QL: NEGATIVE — SIGNIFICANT CHANGE UP
BLD GP AB SCN SERPL QL: NEGATIVE — SIGNIFICANT CHANGE UP
BLOOD GAS VENOUS COMPREHENSIVE RESULT: SIGNIFICANT CHANGE UP
BLOOD GAS VENOUS COMPREHENSIVE RESULT: SIGNIFICANT CHANGE UP
BUN SERPL-MCNC: 17 MG/DL — SIGNIFICANT CHANGE UP (ref 7–23)
BUN SERPL-MCNC: 20 MG/DL — SIGNIFICANT CHANGE UP (ref 7–23)
BUN SERPL-MCNC: 24 MG/DL — HIGH (ref 7–23)
CALCIUM SERPL-MCNC: 8.4 MG/DL — SIGNIFICANT CHANGE UP (ref 8.4–10.5)
CALCIUM SERPL-MCNC: 8.4 MG/DL — SIGNIFICANT CHANGE UP (ref 8.4–10.5)
CALCIUM SERPL-MCNC: 9.2 MG/DL — SIGNIFICANT CHANGE UP (ref 8.4–10.5)
CHLORIDE BLDV-SCNC: 106 MMOL/L — SIGNIFICANT CHANGE UP (ref 96–108)
CHLORIDE SERPL-SCNC: 103 MMOL/L — SIGNIFICANT CHANGE UP (ref 98–107)
CHLORIDE SERPL-SCNC: 107 MMOL/L — SIGNIFICANT CHANGE UP (ref 98–107)
CHLORIDE SERPL-SCNC: 109 MMOL/L — HIGH (ref 98–107)
CO2 BLDV-SCNC: 30 MMOL/L — HIGH (ref 22–26)
CO2 SERPL-SCNC: 23 MMOL/L — SIGNIFICANT CHANGE UP (ref 22–31)
CO2 SERPL-SCNC: 23 MMOL/L — SIGNIFICANT CHANGE UP (ref 22–31)
CO2 SERPL-SCNC: 24 MMOL/L — SIGNIFICANT CHANGE UP (ref 22–31)
CREAT SERPL-MCNC: 0.96 MG/DL — SIGNIFICANT CHANGE UP (ref 0.5–1.3)
CREAT SERPL-MCNC: 1.02 MG/DL — SIGNIFICANT CHANGE UP (ref 0.5–1.3)
CREAT SERPL-MCNC: 1.09 MG/DL — SIGNIFICANT CHANGE UP (ref 0.5–1.3)
EGFR: 73 ML/MIN/1.73M2 — SIGNIFICANT CHANGE UP
EGFR: 80 ML/MIN/1.73M2 — SIGNIFICANT CHANGE UP
EGFR: 86 ML/MIN/1.73M2 — SIGNIFICANT CHANGE UP
EOSINOPHIL # BLD AUTO: 0.23 K/UL — SIGNIFICANT CHANGE UP (ref 0–0.5)
EOSINOPHIL # BLD AUTO: 0.25 K/UL — SIGNIFICANT CHANGE UP (ref 0–0.5)
EOSINOPHIL NFR BLD AUTO: 3.5 % — SIGNIFICANT CHANGE UP (ref 0–6)
EOSINOPHIL NFR BLD AUTO: 4.5 % — SIGNIFICANT CHANGE UP (ref 0–6)
FERRITIN SERPL-MCNC: 58 NG/ML — SIGNIFICANT CHANGE UP (ref 30–400)
FLUAV AG NPH QL: SIGNIFICANT CHANGE UP
FLUBV AG NPH QL: SIGNIFICANT CHANGE UP
GAS PNL BLDV: 139 MMOL/L — SIGNIFICANT CHANGE UP (ref 136–145)
GLUCOSE BLDC GLUCOMTR-MCNC: 120 MG/DL — HIGH (ref 70–99)
GLUCOSE BLDC GLUCOMTR-MCNC: 131 MG/DL — HIGH (ref 70–99)
GLUCOSE BLDC GLUCOMTR-MCNC: 156 MG/DL — HIGH (ref 70–99)
GLUCOSE BLDC GLUCOMTR-MCNC: 158 MG/DL — HIGH (ref 70–99)
GLUCOSE BLDC GLUCOMTR-MCNC: 173 MG/DL — HIGH (ref 70–99)
GLUCOSE BLDC GLUCOMTR-MCNC: 92 MG/DL — SIGNIFICANT CHANGE UP (ref 70–99)
GLUCOSE BLDC GLUCOMTR-MCNC: 98 MG/DL — SIGNIFICANT CHANGE UP (ref 70–99)
GLUCOSE BLDV-MCNC: 83 MG/DL — SIGNIFICANT CHANGE UP (ref 70–99)
GLUCOSE SERPL-MCNC: 139 MG/DL — HIGH (ref 70–99)
GLUCOSE SERPL-MCNC: 87 MG/DL — SIGNIFICANT CHANGE UP (ref 70–99)
GLUCOSE SERPL-MCNC: 94 MG/DL — SIGNIFICANT CHANGE UP (ref 70–99)
HCO3 BLDV-SCNC: 28 MMOL/L — SIGNIFICANT CHANGE UP (ref 22–29)
HCT VFR BLD CALC: 27.1 % — LOW (ref 39–50)
HCT VFR BLD CALC: 27.8 % — LOW (ref 39–50)
HCT VFR BLD CALC: 29.3 % — LOW (ref 39–50)
HCT VFR BLDA CALC: 26 % — LOW (ref 39–51)
HGB BLD CALC-MCNC: 8.8 G/DL — LOW (ref 12.6–17.4)
HGB BLD-MCNC: 8.7 G/DL — LOW (ref 13–17)
HGB BLD-MCNC: 9.2 G/DL — LOW (ref 13–17)
HGB BLD-MCNC: 9.6 G/DL — LOW (ref 13–17)
IANC: 2.73 K/UL — SIGNIFICANT CHANGE UP (ref 1.8–7.4)
IANC: 3.71 K/UL — SIGNIFICANT CHANGE UP (ref 1.8–7.4)
IMM GRANULOCYTES NFR BLD AUTO: 0.4 % — SIGNIFICANT CHANGE UP (ref 0–0.9)
IMM GRANULOCYTES NFR BLD AUTO: 0.5 % — SIGNIFICANT CHANGE UP (ref 0–0.9)
INR BLD: 1.15 RATIO — SIGNIFICANT CHANGE UP (ref 0.88–1.16)
IRON SATN MFR SERPL: 104 UG/DL — SIGNIFICANT CHANGE UP (ref 45–165)
IRON SATN MFR SERPL: 37 % — SIGNIFICANT CHANGE UP (ref 14–50)
LACTATE BLDV-MCNC: 1 MMOL/L — SIGNIFICANT CHANGE UP (ref 0.5–2)
LIDOCAIN IGE QN: 73 U/L — HIGH (ref 7–60)
LYMPHOCYTES # BLD AUTO: 1.91 K/UL — SIGNIFICANT CHANGE UP (ref 1–3.3)
LYMPHOCYTES # BLD AUTO: 2.08 K/UL — SIGNIFICANT CHANGE UP (ref 1–3.3)
LYMPHOCYTES # BLD AUTO: 31.4 % — SIGNIFICANT CHANGE UP (ref 13–44)
LYMPHOCYTES # BLD AUTO: 34.7 % — SIGNIFICANT CHANGE UP (ref 13–44)
MAGNESIUM SERPL-MCNC: 2 MG/DL — SIGNIFICANT CHANGE UP (ref 1.6–2.6)
MAGNESIUM SERPL-MCNC: 2.1 MG/DL — SIGNIFICANT CHANGE UP (ref 1.6–2.6)
MCHC RBC-ENTMCNC: 28.8 PG — SIGNIFICANT CHANGE UP (ref 27–34)
MCHC RBC-ENTMCNC: 29.6 PG — SIGNIFICANT CHANGE UP (ref 27–34)
MCHC RBC-ENTMCNC: 29.6 PG — SIGNIFICANT CHANGE UP (ref 27–34)
MCHC RBC-ENTMCNC: 32.1 GM/DL — SIGNIFICANT CHANGE UP (ref 32–36)
MCHC RBC-ENTMCNC: 32.8 GM/DL — SIGNIFICANT CHANGE UP (ref 32–36)
MCHC RBC-ENTMCNC: 33.1 GM/DL — SIGNIFICANT CHANGE UP (ref 32–36)
MCV RBC AUTO: 89.4 FL — SIGNIFICANT CHANGE UP (ref 80–100)
MCV RBC AUTO: 89.7 FL — SIGNIFICANT CHANGE UP (ref 80–100)
MCV RBC AUTO: 90.4 FL — SIGNIFICANT CHANGE UP (ref 80–100)
MONOCYTES # BLD AUTO: 0.55 K/UL — SIGNIFICANT CHANGE UP (ref 0–0.9)
MONOCYTES # BLD AUTO: 0.56 K/UL — SIGNIFICANT CHANGE UP (ref 0–0.9)
MONOCYTES NFR BLD AUTO: 10.2 % — SIGNIFICANT CHANGE UP (ref 2–14)
MONOCYTES NFR BLD AUTO: 8.3 % — SIGNIFICANT CHANGE UP (ref 2–14)
NEUTROPHILS # BLD AUTO: 2.73 K/UL — SIGNIFICANT CHANGE UP (ref 1.8–7.4)
NEUTROPHILS # BLD AUTO: 3.71 K/UL — SIGNIFICANT CHANGE UP (ref 1.8–7.4)
NEUTROPHILS NFR BLD AUTO: 49.7 % — SIGNIFICANT CHANGE UP (ref 43–77)
NEUTROPHILS NFR BLD AUTO: 56 % — SIGNIFICANT CHANGE UP (ref 43–77)
NRBC # BLD: 0 /100 WBCS — SIGNIFICANT CHANGE UP (ref 0–0)
NRBC # FLD: 0 K/UL — SIGNIFICANT CHANGE UP (ref 0–0)
OB PNL STL: POSITIVE
PCO2 BLDV: 55 MMHG — SIGNIFICANT CHANGE UP (ref 42–55)
PH BLDV: 7.32 — SIGNIFICANT CHANGE UP (ref 7.32–7.43)
PHOSPHATE SERPL-MCNC: 2.3 MG/DL — LOW (ref 2.5–4.5)
PHOSPHATE SERPL-MCNC: 2.9 MG/DL — SIGNIFICANT CHANGE UP (ref 2.5–4.5)
PLATELET # BLD AUTO: 158 K/UL — SIGNIFICANT CHANGE UP (ref 150–400)
PLATELET # BLD AUTO: 159 K/UL — SIGNIFICANT CHANGE UP (ref 150–400)
PLATELET # BLD AUTO: 170 K/UL — SIGNIFICANT CHANGE UP (ref 150–400)
PO2 BLDV: 34 MMHG — SIGNIFICANT CHANGE UP (ref 25–45)
POTASSIUM BLDV-SCNC: 3.8 MMOL/L — SIGNIFICANT CHANGE UP (ref 3.5–5.1)
POTASSIUM SERPL-MCNC: 3.4 MMOL/L — LOW (ref 3.5–5.3)
POTASSIUM SERPL-MCNC: 3.6 MMOL/L — SIGNIFICANT CHANGE UP (ref 3.5–5.3)
POTASSIUM SERPL-MCNC: 3.8 MMOL/L — SIGNIFICANT CHANGE UP (ref 3.5–5.3)
POTASSIUM SERPL-SCNC: 3.4 MMOL/L — LOW (ref 3.5–5.3)
POTASSIUM SERPL-SCNC: 3.6 MMOL/L — SIGNIFICANT CHANGE UP (ref 3.5–5.3)
POTASSIUM SERPL-SCNC: 3.8 MMOL/L — SIGNIFICANT CHANGE UP (ref 3.5–5.3)
PROT SERPL-MCNC: 6.1 G/DL — SIGNIFICANT CHANGE UP (ref 6–8.3)
PROT SERPL-MCNC: 6.1 G/DL — SIGNIFICANT CHANGE UP (ref 6–8.3)
PROT SERPL-MCNC: 6.5 G/DL — SIGNIFICANT CHANGE UP (ref 6–8.3)
PROTHROM AB SERPL-ACNC: 13.4 SEC — SIGNIFICANT CHANGE UP (ref 10.5–13.4)
RBC # BLD: 3.02 M/UL — LOW (ref 4.2–5.8)
RBC # BLD: 3.11 M/UL — LOW (ref 4.2–5.8)
RBC # BLD: 3.24 M/UL — LOW (ref 4.2–5.8)
RBC # FLD: 13.3 % — SIGNIFICANT CHANGE UP (ref 10.3–14.5)
RBC # FLD: 13.5 % — SIGNIFICANT CHANGE UP (ref 10.3–14.5)
RBC # FLD: 13.5 % — SIGNIFICANT CHANGE UP (ref 10.3–14.5)
RETICS #: 41.6 K/UL — SIGNIFICANT CHANGE UP (ref 25–125)
RETICS/RBC NFR: 1.4 % — SIGNIFICANT CHANGE UP (ref 0.5–2.5)
RH IG SCN BLD-IMP: POSITIVE — SIGNIFICANT CHANGE UP
RH IG SCN BLD-IMP: POSITIVE — SIGNIFICANT CHANGE UP
RSV RNA NPH QL NAA+NON-PROBE: SIGNIFICANT CHANGE UP
SAO2 % BLDV: 51.6 % — LOW (ref 67–88)
SARS-COV-2 RNA SPEC QL NAA+PROBE: SIGNIFICANT CHANGE UP
SODIUM SERPL-SCNC: 138 MMOL/L — SIGNIFICANT CHANGE UP (ref 135–145)
SODIUM SERPL-SCNC: 141 MMOL/L — SIGNIFICANT CHANGE UP (ref 135–145)
SODIUM SERPL-SCNC: 142 MMOL/L — SIGNIFICANT CHANGE UP (ref 135–145)
TIBC SERPL-MCNC: 282 UG/DL — SIGNIFICANT CHANGE UP (ref 220–430)
TROPONIN T, HIGH SENSITIVITY RESULT: 7 NG/L — SIGNIFICANT CHANGE UP
UIBC SERPL-MCNC: 178 UG/DL — SIGNIFICANT CHANGE UP (ref 110–370)
WBC # BLD: 4.92 K/UL — SIGNIFICANT CHANGE UP (ref 3.8–10.5)
WBC # BLD: 5.5 K/UL — SIGNIFICANT CHANGE UP (ref 3.8–10.5)
WBC # BLD: 6.62 K/UL — SIGNIFICANT CHANGE UP (ref 3.8–10.5)
WBC # FLD AUTO: 4.92 K/UL — SIGNIFICANT CHANGE UP (ref 3.8–10.5)
WBC # FLD AUTO: 5.5 K/UL — SIGNIFICANT CHANGE UP (ref 3.8–10.5)
WBC # FLD AUTO: 6.62 K/UL — SIGNIFICANT CHANGE UP (ref 3.8–10.5)

## 2023-04-05 PROCEDURE — 43255 EGD CONTROL BLEEDING ANY: CPT | Mod: GC

## 2023-04-05 PROCEDURE — 93010 ELECTROCARDIOGRAM REPORT: CPT

## 2023-04-05 PROCEDURE — 99223 1ST HOSP IP/OBS HIGH 75: CPT | Mod: GC

## 2023-04-05 PROCEDURE — 99222 1ST HOSP IP/OBS MODERATE 55: CPT | Mod: GC,25

## 2023-04-05 DEVICE — CLIP RESOLUTION 360 ULTRA 235CM 20/BX: Type: IMPLANTABLE DEVICE | Status: FUNCTIONAL

## 2023-04-05 RX ORDER — SODIUM CHLORIDE 9 MG/ML
1000 INJECTION, SOLUTION INTRAVENOUS
Refills: 0 | Status: DISCONTINUED | OUTPATIENT
Start: 2023-04-05 | End: 2023-04-09

## 2023-04-05 RX ORDER — ACETAMINOPHEN 500 MG
650 TABLET ORAL EVERY 6 HOURS
Refills: 0 | Status: DISCONTINUED | OUTPATIENT
Start: 2023-04-05 | End: 2023-04-05

## 2023-04-05 RX ORDER — LANOLIN ALCOHOL/MO/W.PET/CERES
3 CREAM (GRAM) TOPICAL AT BEDTIME
Refills: 0 | Status: DISCONTINUED | OUTPATIENT
Start: 2023-04-05 | End: 2023-04-09

## 2023-04-05 RX ORDER — ONDANSETRON 8 MG/1
4 TABLET, FILM COATED ORAL EVERY 8 HOURS
Refills: 0 | Status: DISCONTINUED | OUTPATIENT
Start: 2023-04-05 | End: 2023-04-09

## 2023-04-05 RX ORDER — INSULIN LISPRO 100/ML
VIAL (ML) SUBCUTANEOUS
Refills: 0 | Status: DISCONTINUED | OUTPATIENT
Start: 2023-04-05 | End: 2023-04-09

## 2023-04-05 RX ORDER — GLUCAGON INJECTION, SOLUTION 0.5 MG/.1ML
1 INJECTION, SOLUTION SUBCUTANEOUS ONCE
Refills: 0 | Status: DISCONTINUED | OUTPATIENT
Start: 2023-04-05 | End: 2023-04-09

## 2023-04-05 RX ORDER — INSULIN LISPRO 100/ML
VIAL (ML) SUBCUTANEOUS AT BEDTIME
Refills: 0 | Status: DISCONTINUED | OUTPATIENT
Start: 2023-04-05 | End: 2023-04-09

## 2023-04-05 RX ORDER — PANTOPRAZOLE SODIUM 20 MG/1
40 TABLET, DELAYED RELEASE ORAL EVERY 12 HOURS
Refills: 0 | Status: DISCONTINUED | OUTPATIENT
Start: 2023-04-05 | End: 2023-04-07

## 2023-04-05 RX ORDER — DEXTROSE 50 % IN WATER 50 %
25 SYRINGE (ML) INTRAVENOUS ONCE
Refills: 0 | Status: DISCONTINUED | OUTPATIENT
Start: 2023-04-05 | End: 2023-04-09

## 2023-04-05 RX ORDER — ATORVASTATIN CALCIUM 80 MG/1
1 TABLET, FILM COATED ORAL
Refills: 0 | DISCHARGE

## 2023-04-05 RX ORDER — DEXTROSE 50 % IN WATER 50 %
12.5 SYRINGE (ML) INTRAVENOUS ONCE
Refills: 0 | Status: DISCONTINUED | OUTPATIENT
Start: 2023-04-05 | End: 2023-04-09

## 2023-04-05 RX ORDER — DEXTROSE 50 % IN WATER 50 %
15 SYRINGE (ML) INTRAVENOUS ONCE
Refills: 0 | Status: DISCONTINUED | OUTPATIENT
Start: 2023-04-05 | End: 2023-04-09

## 2023-04-05 RX ADMIN — SODIUM CHLORIDE 1000 MILLILITER(S): 9 INJECTION INTRAMUSCULAR; INTRAVENOUS; SUBCUTANEOUS at 01:02

## 2023-04-05 RX ADMIN — PANTOPRAZOLE SODIUM 40 MILLIGRAM(S): 20 TABLET, DELAYED RELEASE ORAL at 18:10

## 2023-04-05 RX ADMIN — PANTOPRAZOLE SODIUM 40 MILLIGRAM(S): 20 TABLET, DELAYED RELEASE ORAL at 09:11

## 2023-04-05 RX ADMIN — Medication 1: at 12:20

## 2023-04-05 RX ADMIN — Medication 1: at 18:10

## 2023-04-05 RX ADMIN — PANTOPRAZOLE SODIUM 80 MILLIGRAM(S): 20 TABLET, DELAYED RELEASE ORAL at 01:02

## 2023-04-05 NOTE — CONSULT NOTE ADULT - SUBJECTIVE AND OBJECTIVE BOX
HPI:  67-year-old male past medical history HTN HLD DM CAD status post stents on aspirin/Plavix presents with 5 days of dark black stool associated with epigastric pain described as burning.  No similar history of dark stool.  Has never seen a GI doctor or EGD.  Last colonoscopy over 7 years ago.  No nausea vomiting.  No chest pain shortness of breath fevers chills. Hb found 9.6 on admission now down to 8.7. GI called for further evaluation.     Allergies:  No Known Allergies        Hospital Medications:  acetaminophen     Tablet .. 650 milliGRAM(s) Oral every 6 hours PRN  aluminum hydroxide/magnesium hydroxide/simethicone Suspension 30 milliLiter(s) Oral every 4 hours PRN  melatonin 3 milliGRAM(s) Oral at bedtime PRN  ondansetron Injectable 4 milliGRAM(s) IV Push every 8 hours PRN  pantoprazole  Injectable 40 milliGRAM(s) IV Push every 12 hours      PMHX/PSHX:  Hypertension, unspecified type    Hyperlipidemia, unspecified hyperlipidemia type    Coronary artery disease, angina presence unspecified, unspecified vessel or lesion type, unspecified whether native or transplanted heart    No significant past surgical history        Family history:  No pertinent family history in first degree relatives        Social History: no smoking    ROS:   General:  No fevers, chills or night sweats  ENT:  No sore throat or dysphagia  CV:  No pain or palpitations  Resp:  No dyspnea, cough or  wheezing  GI:  as above  Skin:  No rash or edema  Neuro: no weakness   Hematologic: no bleeding  Musculoskeletal: no muscle pain or join pain  Psych: no agitation     : no dysuria      PHYSICAL EXAM:   GENERAL:  NAD, Appears stated age  HEENT:  NC/AT,  conjunctivae clear and pink, sclera -anicteric  CHEST:  CTA B/L, Normal effort  HEART:  RRR S1/S2,  ABDOMEN:  Soft, non-tender, non-distended,  no masses   EXTREMITIES:  No cyanosis or Edema  SKIN:  Warm & Dry. No rash or erythema  NEURO:  Alert, oriented, no focal deficit    Vital Signs:  Vital Signs Last 24 Hrs  T(C): 36.4 (05 Apr 2023 06:15), Max: 36.5 (04 Apr 2023 21:49)  T(F): 97.5 (05 Apr 2023 06:15), Max: 97.7 (04 Apr 2023 21:49)  HR: 60 (05 Apr 2023 06:15) (58 - 70)  BP: 130/66 (05 Apr 2023 06:15) (107/72 - 130/66)  BP(mean): --  RR: 18 (05 Apr 2023 06:15) (16 - 18)  SpO2: 98% (05 Apr 2023 06:15) (97% - 100%)    Parameters below as of 05 Apr 2023 06:15  Patient On (Oxygen Delivery Method): room air      Daily Height in cm: 170.18 (05 Apr 2023 06:15)    Daily     LABS:                        8.7    4.92  )-----------( 159      ( 05 Apr 2023 05:30 )             27.1     Mean Cell Volume: 89.7 fL (04-05-23 @ 05:30)    04-05    142  |  109<H>  |  20  ----------------------------<  94  3.6   |  23  |  1.02    Ca    8.4      05 Apr 2023 05:30  Phos  2.9     04-05  Mg     2.10     04-05    TPro  6.1  /  Alb  3.7  /  TBili  0.4  /  DBili  x   /  AST  19  /  ALT  26  /  AlkPhos  46  04-05    LIVER FUNCTIONS - ( 05 Apr 2023 05:30 )  Alb: 3.7 g/dL / Pro: 6.1 g/dL / ALK PHOS: 46 U/L / ALT: 26 U/L / AST: 19 U/L / GGT: x           PT/INR - ( 05 Apr 2023 05:30 )   PT: 13.4 sec;   INR: 1.15 ratio         PTT - ( 05 Apr 2023 05:30 )  PTT:29.3 sec    Amylase Serum--      Lipase serum73       Ammonia--                          8.7    4.92  )-----------( 159      ( 05 Apr 2023 05:30 )             27.1                         9.6    5.50  )-----------( 170      ( 05 Apr 2023 00:59 )             29.3     Imaging:     HPI:  67-year-old male past medical history HTN HLD DM CAD status post stents on aspirin/Plavix presents with 5 days of dark black stool associated with epigastric pain described as burning.  No similar history of dark stool.  Has never seen a GI doctor or EGD.  Last colonoscopy over 7 years ago.  No nausea vomiting.  No chest pain shortness of breath fevers chills. Hb found 9.6 on admission now down to 8.7. GI called for further evaluation.   Denies any chest pain, SOB or previous GI issues in the past.     Allergies:  No Known Allergies        Hospital Medications:  acetaminophen     Tablet .. 650 milliGRAM(s) Oral every 6 hours PRN  aluminum hydroxide/magnesium hydroxide/simethicone Suspension 30 milliLiter(s) Oral every 4 hours PRN  melatonin 3 milliGRAM(s) Oral at bedtime PRN  ondansetron Injectable 4 milliGRAM(s) IV Push every 8 hours PRN  pantoprazole  Injectable 40 milliGRAM(s) IV Push every 12 hours      PMHX/PSHX:  Hypertension, unspecified type    Hyperlipidemia, unspecified hyperlipidemia type    Coronary artery disease, angina presence unspecified, unspecified vessel or lesion type, unspecified whether native or transplanted heart    No significant past surgical history        Family history:  No pertinent family history in first degree relatives        Social History: no smoking    ROS:   General:  No fevers, chills or night sweats  ENT:  No sore throat or dysphagia  CV:  No pain or palpitations  Resp:  No dyspnea, cough or  wheezing  GI:  as above  Skin:  No rash or edema  Neuro: no weakness   Hematologic: no bleeding  Musculoskeletal: no muscle pain or join pain  Psych: no agitation     : no dysuria      PHYSICAL EXAM:   GENERAL:  NAD, Appears stated age  HEENT:  NC/AT,  conjunctivae clear and pink, sclera -anicteric  CHEST:  CTA B/L, Normal effort  HEART:  RRR S1/S2,  ABDOMEN:  Soft, non-tender, non-distended,  no masses, RONNELL with no blood or stool   EXTREMITIES:  No cyanosis or Edema  SKIN:  Warm & Dry. No rash or erythema  NEURO:  Alert, oriented, no focal deficit    Vital Signs:  Vital Signs Last 24 Hrs  T(C): 36.4 (05 Apr 2023 06:15), Max: 36.5 (04 Apr 2023 21:49)  T(F): 97.5 (05 Apr 2023 06:15), Max: 97.7 (04 Apr 2023 21:49)  HR: 60 (05 Apr 2023 06:15) (58 - 70)  BP: 130/66 (05 Apr 2023 06:15) (107/72 - 130/66)  BP(mean): --  RR: 18 (05 Apr 2023 06:15) (16 - 18)  SpO2: 98% (05 Apr 2023 06:15) (97% - 100%)    Parameters below as of 05 Apr 2023 06:15  Patient On (Oxygen Delivery Method): room air      Daily Height in cm: 170.18 (05 Apr 2023 06:15)    Daily     LABS:                        8.7    4.92  )-----------( 159      ( 05 Apr 2023 05:30 )             27.1     Mean Cell Volume: 89.7 fL (04-05-23 @ 05:30)    04-05    142  |  109<H>  |  20  ----------------------------<  94  3.6   |  23  |  1.02    Ca    8.4      05 Apr 2023 05:30  Phos  2.9     04-05  Mg     2.10     04-05    TPro  6.1  /  Alb  3.7  /  TBili  0.4  /  DBili  x   /  AST  19  /  ALT  26  /  AlkPhos  46  04-05    LIVER FUNCTIONS - ( 05 Apr 2023 05:30 )  Alb: 3.7 g/dL / Pro: 6.1 g/dL / ALK PHOS: 46 U/L / ALT: 26 U/L / AST: 19 U/L / GGT: x           PT/INR - ( 05 Apr 2023 05:30 )   PT: 13.4 sec;   INR: 1.15 ratio         PTT - ( 05 Apr 2023 05:30 )  PTT:29.3 sec    Amylase Serum--      Lipase serum73       Ammonia--                          8.7    4.92  )-----------( 159      ( 05 Apr 2023 05:30 )             27.1                         9.6    5.50  )-----------( 170      ( 05 Apr 2023 00:59 )             29.3     Imaging:

## 2023-04-05 NOTE — H&P ADULT - NSHPLABSRESULTS_GEN_ALL_CORE
.  LABS:                         8.7    4.92  )-----------( 159      ( 05 Apr 2023 05:30 )             27.1     04-05    142  |  109<H>  |  20  ----------------------------<  94  3.6   |  23  |  1.02    Ca    8.4      05 Apr 2023 05:30  Phos  2.9     04-05  Mg     2.10     04-05    TPro  6.1  /  Alb  3.7  /  TBili  0.4  /  DBili  x   /  AST  19  /  ALT  26  /  AlkPhos  46  04-05    PT/INR - ( 05 Apr 2023 05:30 )   PT: 13.4 sec;   INR: 1.15 ratio         PTT - ( 05 Apr 2023 05:30 )  PTT:29.3 sec

## 2023-04-05 NOTE — H&P ADULT - PROBLEM SELECTOR PLAN 4
on Glipizide at home  - hold PO antihyperglycemic  - ISS q6h while NPO; then TID AC + HS  - CCB diet  - NPO for endoscopy now on Glipizide at home  - hold PO antihyperglycemic  - ISS q6h while NPO; then TID AC + HS  - CCB diet

## 2023-04-05 NOTE — H&P ADULT - ATTENDING COMMENTS
66M HTN, DM2, HLD, CAD s/p PCI (2021) on DAPT who presents with 5 days of black stools concerning for UGIB. GI consulted and patient taken to endoscopy which showed ulcer with non-bleeding vessel that was clipped. Will obtain H. pylori studies. c/w PPI BID x 8 weeks. Will continue to monitor CBC q8h for now.

## 2023-04-05 NOTE — H&P ADULT - PROBLEM SELECTOR PLAN 5
DVT ppx: on hold due to GIB; SCDs  - Diet: NPO for procedure then, restart CCB  - Dispo: pt independent, walks w/o assistance >> likely will return home

## 2023-04-05 NOTE — H&P ADULT - HISTORY OF PRESENT ILLNESS
IN PROGRESS    67-year-old male past medical history HTN HLD DM CAD status post stents on aspirin Plavix presents with 5 days of multiple episodes dark black stool . Associated with burning epigastric pain.    Last colonoscopy over 7 years ago.  No nausea vomiting.  No chest pain shortness of breath fevers chills.  No lightheadedness dizziness or dyspnea on exertion. 67yo M w/ pmhx HTN, DM, HLD, CAD s/p stents (2021) on DAPT presented w/ 5 day hx of daily black stools. He also reported mild burning epigastric pain that self-resolved. Additionally he reported noticeable weight loss for the past few weeks; no changes in eating habits. He endorsed nausea but denied vomit or diarrhea. Also denied chest pain, fevers, chills, SOB, weakness, dizziness lightheadedness.  65yo M w/ pmhx HTN, DM, HLD, CAD s/p stents (2021) on DAPT presented w/ 5 day hx of daily black stools. He also reported mild burning epigastric pain that self-resolved. Additionally he reported noticeable weight loss for the past few weeks; no changes in eating habits. He endorsed nausea but denied vomit or diarrhea. Also denied chest pain, fevers, chills, SOB, weakness, dizziness lightheadedness. Denies having GI bleeding/dark stool in the past and does not have a GI doctor. Has not had a colonoscopy in > 7 years,     ED course: HDS, Hgb dropped 1 point and GI consulted for EGD, found to have non-bleeding ulcer

## 2023-04-05 NOTE — ED PROVIDER NOTE - ATTENDING CONTRIBUTION TO CARE
67-year-old male past medical history of coronary disease on Plavix and aspirin diabetes hypertension hyperlipidemia presenting with 5 days of reported black stools.  The most episodes of the same.  Patient has no history of similar episodes.  This dark stools associate with epigastric burning sensation.  It is intermittent in nature.    Vitals: I have reviewed the patients vital signs  General: nontoxic appearing  HEENT: Atraumatic, normocephalic, airway patent  Eyes: EOMI, tracking appropriately  Neck: no tracheal deviation  Chest/Lungs: no trauma, symmetric chest rise, speaking in complete sentences,  no resp distress  Heart: skin and extremities well perfused, regular rate and rhythm  Neuro: A+Ox3, appears non focal  MSK: no deformities  Skin: no cyanosis, no jaundice   Psych:  Normal mood and affect    67-year-old past medical history of diabetes hyperlipidemia hypertension CAD on aspirin and Plavix presenting with dark black stools and epigastric burning.  Differential includes but is not limited to upper GI bleed secondary to peptic ulcer disease, lower GI bleed, pancreatitis.  Patient will require laboratory studies guaiac to evaluate for bleeding status.  As the patient does not have a history of seeing GI or EGD low threshold to admit

## 2023-04-05 NOTE — ED ADULT NURSE NOTE - OBJECTIVE STATEMENT
PT. presented to room 17. A&Ox4 ambulatory. Pt. c/o black tarry stools x 5 days, epigastric pain dizziness CALIX. Pt. PT. presented to room 17. A&Ox4 ambulatory. Pt. c/o black tarry stools x 5 days, epigastric pain dizziness CALIX.  Pt. on plavix & aspirin. PMHx CAD x 3 stents, HTN, T2DM. denies CP n/v/d dysuria. vitals signs stable. LAC18G labs sent medicated per order. pt. resting comfortably in bed.

## 2023-04-05 NOTE — CONSULT NOTE ADULT - ASSESSMENT
67-year-old male past medical history HTN HLD DM CAD status post stents on aspirin Plavix presents with 5 days of multiple episodes dark black stool     EKG: NSR lateral TWI    1. Pre-op assessment  -denies CP, SOB  -Echo 6/2021 normal LV function  -states he recently underwent LHC on March 21, 2023 at [facility]. Reported normal with no new blockages.  -optimized from cardiac standpoint for EGD, moderate risk for MACE      2. CAD s/p PCI  -last PCI 3/2021 can hold plavix       3. GIB  -p/w black stool, occult positive plan for EGD  -f/u GI

## 2023-04-05 NOTE — H&P ADULT - PROBLEM SELECTOR PLAN 1
- Multiple episodes of dark stools associated w/ burning epigastric pain  - Concern for PUD  - GI consulted:   --- Keep on clear liquid diet  - Will need Cardiology input  - No CI to continue Aspirin, would obtain cardiology input regarding Plavix need given PCI in 2021  - tentatively can plan for EGD today vs tomorrow pending availability   - Trend CBC   - Maintain active type & screen  - Transfuse to maintain Hbg > 7.0 or > 8 inpatient with pre-existing cardiovascular conditions.  - Plt goal > 50  - Check serum iron, TIBC, ferritin  - Maintain access with 2 x Large bore IV  - Continue Pantoprazole 40 mg IV q12H - Multiple episodes of dark stools associated w/ burning epigastric pain concerning for GIB.  - GI consulted:   --- CLD, EGD (april/4/2023)  --- TIBC, Iron, Ferritin  --- PPI q12h  - No CI to restart ASA   - Cardiology consulted for risk-stratification and reassessment of DAPT - Multiple episodes of dark stools associated w/ burning epigastric pain concerning for GIB.  - GI consulted:   --- CLD, EGD (april/5/2023)  --- TIBC, Iron, Ferritin  --- PPI q12h  - No CI to restart ASA   - Cardiology consulted for risk-stratification and reassessment of DAPT Multiple episodes of dark stools associated w/ burning epigastric pain concerning for GIB.  Iron studies WNL   GI consulted for EGD, found to have multiple non-bleeding ulcers   Per cards, can hold plavix for now   - c/w PPI q12h  - No CI to restart ASA   - restart diet   - H pylori stool antigen and serology ordered

## 2023-04-05 NOTE — H&P ADULT - NSHPREVIEWOFSYSTEMS_GEN_ALL_CORE
Review of Systems:  Constitutional: No fever, No weight loss, good appetite/po intake  Head: No headache   Eyes: No blurry vision, No diplopia  Neuro: No tremors, No muscle weakness   Cardiovascular: No chest pain, No palpitations  Respiratory: No SOB, No cough  GI: + nausea, No vomiting, No diarrhea, + black stools  : No dysuria, No hematuria  Skin: No rash  MSK: No joint pain   Psych: No depression  Heme: No abnormal bruising, no abnormal bleeding

## 2023-04-05 NOTE — PATIENT PROFILE ADULT - FALL HARM RISK - HARM RISK INTERVENTIONS
Assistance with ambulation/Assistance OOB with selected safe patient handling equipment/Communicate Risk of Fall with Harm to all staff/Monitor gait and stability/Reinforce activity limits and safety measures with patient and family/Sit up slowly, dangle for a short time, stand at bedside before walking/Tailored Fall Risk Interventions/Visual Cue: Yellow wristband and red socks/Bed in lowest position, wheels locked, appropriate side rails in place/Call bell, personal items and telephone in reach/Instruct patient to call for assistance before getting out of bed or chair/Non-slip footwear when patient is out of bed/[place] to call system/Physically safe environment - no spills, clutter or unnecessary equipment/Purposeful Proactive Rounding/Room/bathroom lighting operational, light cord in reach

## 2023-04-05 NOTE — RAPID RESPONSE TEAM SUMMARY - NSSITUATIONBACKGROUNDRRT_GEN_ALL_CORE
66M HTN, DM2, HLD, CAD s/p PCI (2021) on DAPT who presents with 5 days of black stools concerning for UGIB. GI consulted and patient taken to endoscopy which showed ulcer with non-bleeding vessel that was clipped. Will obtain H. pylori studies. c/w PPI BID x 8 weeks. RRT called as patient was ambulating from bathroom to his hospital bed, found to be weak and per staff and family syncopized. Patient regained consciousness did not appear to be post-ictal after the period. When RRT arrived patient in hospital bed awake, alert, and oriented x3 with family at the bedside helping with translation. FS wnl. No chest pain or palpitations, cardiac monitor at bedside without  66M HTN, DM2, HLD, CAD s/p PCI (2021) on DAPT who presents with 5 days of black stools concerning for UGIB. GI consulted and patient taken to endoscopy which showed ulcer with non-bleeding vessel that was clipped. Will obtain H. pylori studies. c/w PPI BID x 8 weeks. RRT called as patient was ambulating from bathroom to his hospital bed, found to be weak and per staff and family syncopized. Patient regained consciousness did not appear to be post-ictal after the period. When RRT arrived patient in hospital bed awake, alert, and oriented x3 with family at the bedside helping with translation. FS wnl. No chest pain or palpitations, cardiac monitor at bedside without any evidence of bradycardia or tachycardia. Patient vitals wnl otherwise, BP wnl at the time of evaluation. Repeat labs drawn, no evidence of large bloody BM post endoscopy or hematemesis per family and patient. Given 1 L LR bolus given patient fasting for endoscopy and for Ramadan prior to arrival to hospital.

## 2023-04-05 NOTE — ED PROVIDER NOTE - PHYSICAL EXAMINATION
Gen: NAD, non-toxic appearing  Head: normal appearing  HEENT: normal conjunctiva, oral mucosa moist  Lung: no respiratory distress, speaking in full sentences, CTA b/l     CV: regular rate and rhythm, no murmurs  Abd: soft, non distended, non tender   MSK: no visible deformities  Neuro: No focal deficits, AAOx3  Skin: Warm  Psych: normal affect  : rectal exam chaperone RN no gross or dark blood in vault

## 2023-04-05 NOTE — PATIENT PROFILE ADULT - FUNCTIONAL ASSESSMENT - BASIC MOBILITY 6.
Not able to assess (calculate score using AMPAC averaging method)  3-calculated by average/Not able to assess (calculate score using [facility] averaging method)

## 2023-04-05 NOTE — H&P ADULT - ASSESSMENT
HTN HLD DM CAD status post stents on aspirin Plavix 67yo M w/ pmhx HTN, DM, HLD, CAD s/p stents (2021) on DAPT presented w/ 5 day hx of daily black stools admitted for further work up of GIB.

## 2023-04-05 NOTE — ED PROVIDER NOTE - OBJECTIVE STATEMENT
67-year-old male past medical history HTN HLD DM CAD status post stents on aspirin Plavix presents with 5 days of dark black stool.  multiple episodes. Associated with epigastric pain described as burning.  No similar history of dark stool.  Has never seen a GI doctor or EGD.  Last colonoscopy over 7 years ago.  No nausea vomiting.  No chest pain shortness of breath fevers chills.  No lightheadedness dizziness or dyspnea on exertion.

## 2023-04-05 NOTE — ED ADULT NURSE REASSESSMENT NOTE - NS ED NURSE REASSESS COMMENT FT1
Break RN note- Patient resting quietly in bed, breathing even and nonlabored. No acute distress. Patient appears comfortable. Awaiting lab results. Safety maintained. Patient stable upon exiting the room.

## 2023-04-05 NOTE — CONSULT NOTE ADULT - ASSESSMENT
67-year-old male past medical history HTN HLD DM CAD status post stents on aspirin/Plavix presents with 5 days of dark black stool associated with epigastric pain described as burning.  No similar history of dark stool.  Has never seen a GI doctor or EGD.  Last colonoscopy over 7 years ago.  No nausea vomiting.  No chest pain shortness of breath fevers chills. Hb found 9.6 on admission now down to 8.7. GI called for further evaluation.       #UGIB  Suspecting Peptic Ulcer disease from H pylori vs NSAIDs use. Alternative etiology included Dieulafoy lesion, NSAIDs gastropathy,  malignancy and angiodysplasia       #CAD s/p PCI on DAPT  #HTN  #HLD    Recommendations:  - Keep on clear liquid diet  - Will need Cardiology input/clearance  - No CI to continue Aspirin, would obtain cardiology input regarding Plavix need given PCI in 2021  - NPO after midnight, plan for EGD tomorrow   - Trend CBC   - Maintain active type & screen  - Transfuse to maintain Hbg > 7.0 or > 8 inpatient with pre-existing cardiovascular conditions.  - Plt goal > 50  - Check serum iron, TIBC, ferritin  - Maintain access with 2 x Large bore IV  - Continue Pantoprazole 40 mg IV q12H    Recommendations preliminary until signed by attending.     Garo Huston MD  Gastroenterology/Hepatology Fellow  1st option: [phone] (text or call), ONLY available from 7:00 am to 5:00 pm.   **Contact on-call GI fellow via answering service ([phone]) from 5pm-7am AND on weekends/holidays**  2nd option: Available via Microsoft Teams  3rd option: Pager: [phone]         67-year-old male past medical history HTN HLD DM CAD status post stents on aspirin/Plavix presents with 5 days of dark black stool associated with epigastric pain described as burning.  No similar history of dark stool.  Has never seen a GI doctor or EGD.  Last colonoscopy over 7 years ago.  No nausea vomiting.  No chest pain shortness of breath fevers chills. Hb found 9.6 on admission now down to 8.7. GI called for further evaluation.       #UGIB  Suspecting Peptic Ulcer disease from H pylori vs NSAIDs use. Alternative etiology included Dieulafoy lesion, NSAIDs gastropathy,  malignancy and angiodysplasia       #CAD s/p PCI on DAPT  #HTN  #HLD    Recommendations:  - Keep on clear liquid diet  - Will need Cardiology input  - No CI to continue Aspirin, would obtain cardiology input regarding Plavix need given PCI in 2021  - tentatively can plan for EGD today vs tomorrow pending availability   - Trend CBC   - Maintain active type & screen  - Transfuse to maintain Hbg > 7.0 or > 8 inpatient with pre-existing cardiovascular conditions.  - Plt goal > 50  - Check serum iron, TIBC, ferritin  - Maintain access with 2 x Large bore IV  - Continue Pantoprazole 40 mg IV q12H    Recommendations preliminary until signed by attending.     Garo Huston MD  Gastroenterology/Hepatology Fellow  1st option: [phone] (text or call), ONLY available from 7:00 am to 5:00 pm.   **Contact on-call GI fellow via answering service ([phone]) from 5pm-7am AND on weekends/holidays**  2nd option: Available via Microsoft Teams  3rd option: Pager: [phone]

## 2023-04-05 NOTE — H&P ADULT - NSHPPHYSICALEXAM_GEN_ALL_CORE
PHYSICAL EXAM    Vital Signs Last 24 Hrs  T(C): 36.6 (05 Apr 2023 12:15), Max: 36.6 (05 Apr 2023 12:15)  T(F): 97.9 (05 Apr 2023 12:15), Max: 97.9 (05 Apr 2023 12:15)  HR: 58 (05 Apr 2023 12:15) (58 - 70)  BP: 119/74 (05 Apr 2023 12:15) (107/72 - 130/66)  BP(mean): --  RR: 18 (05 Apr 2023 12:15) (16 - 18)  SpO2: 98% (05 Apr 2023 12:15) (97% - 100%)    Parameters below as of 05 Apr 2023 06:15  Patient On (Oxygen Delivery Method): room air          GENERAL: NAD, lying comfortably in bed   HEAD:  Atraumatic, Normocephalic  EYES: EOMI b/l, PERRLA b/l, conjunctiva and sclera clear  NECK: Supple, No JVD, No LAD   CHEST/LUNG: Clear to auscultation bilaterally; No wheeze or rhonchi  HEART: Regular rate and rhythm; S1 and S2 present, No murmurs, rubs, or gallops  ABDOMEN: Soft, Nontender, Nondistended; Bowel sounds present  EXTREMITIES:  2+ Peripheral Pulses, No clubbing, cyanosis, or edema  NEURO: AAOx3, non-focal   SKIN: No rashes or lesions

## 2023-04-05 NOTE — CONSULT NOTE ADULT - SUBJECTIVE AND OBJECTIVE BOX
Barrett Yee MD  Interventional Cardiology / Advance Heart Failure and Cardiac Transplant Specialist  [place] Office : [phone] [place] [id number]  Tel:   [place] Office : [address] [facility] [address]  Tel: [phone]      HISTORY OF PRESENTING ILLNESS:  67-year-old male past medical history HTN HLD DM CAD status post stents on aspirin Plavix presents with 5 days of multiple episodes dark black stool . Associated with burning epigastric pain.    Last colonoscopy over 7 years ago.  No nausea vomiting.  No chest pain shortness of breath fevers chills.  No lightheadedness dizziness or dyspnea on exertion. Occult positive plan for EGD. Cardiology consulted for optimization. Patient recently underwent LHC on March 21, 2023 at [facility]. Reported normal with no new blockages.   	  MEDICATIONS:        melatonin 3 milliGRAM(s) Oral at bedtime PRN  ondansetron Injectable 4 milliGRAM(s) IV Push every 8 hours PRN    aluminum hydroxide/magnesium hydroxide/simethicone Suspension 30 milliLiter(s) Oral every 4 hours PRN  pantoprazole  Injectable 40 milliGRAM(s) IV Push every 12 hours    dextrose 50% Injectable 25 Gram(s) IV Push once  dextrose 50% Injectable 12.5 Gram(s) IV Push once  dextrose 50% Injectable 25 Gram(s) IV Push once  dextrose Oral Gel 15 Gram(s) Oral once PRN  glucagon  Injectable 1 milliGRAM(s) IntraMuscular once  insulin lispro (ADMELOG) corrective regimen sliding scale   SubCutaneous three times a day before meals  insulin lispro (ADMELOG) corrective regimen sliding scale   SubCutaneous at bedtime    dextrose 5%. 1000 milliLiter(s) IV Continuous <Continuous>  dextrose 5%. 1000 milliLiter(s) IV Continuous <Continuous>      PAST MEDICAL/SURGICAL HISTORY  PAST MEDICAL & SURGICAL HISTORY:  Hypertension, unspecified type      Hyperlipidemia, unspecified hyperlipidemia type      Coronary artery disease, angina presence unspecified, unspecified vessel or lesion type, unspecified whether native or transplanted heart  s/p stents      No significant past surgical history          SOCIAL HISTORY: Substance Use (street drugs): ( x ) never used  (  ) other:    FAMILY HISTORY:  No pertinent family history in first degree relatives        REVIEW OF SYSTEMS:  CONSTITUTIONAL: No fever, weight loss, or fatigue  EYES: No eye pain, visual disturbances, or discharge  ENMT:  No difficulty hearing, tinnitus, vertigo; No sinus or throat pain  BREASTS: No pain, masses, or nipple discharge  GASTROINTESTINAL: No abdominal or epigastric pain. No nausea, vomiting, or hematemesis; No diarrhea or constipation. No melena or hematochezia.  GENITOURINARY: No dysuria, frequency, hematuria, or incontinence  NEUROLOGICAL: No headaches, memory loss, loss of strength, numbness, or tremors  ENDOCRINE: No heat or cold intolerance; No hair loss  MUSCULOSKELETAL: No joint pain or swelling; No muscle, back, or extremity pain  PSYCHIATRIC: No depression, anxiety, mood swings, or difficulty sleeping  HEME/LYMPH: No easy bruising, or bleeding gums  All others negative    PHYSICAL EXAM:  T(C): 36.6 (04-05-23 @ 12:15), Max: 36.6 (04-05-23 @ 12:15)  HR: 58 (04-05-23 @ 12:15) (58 - 70)  BP: 119/74 (04-05-23 @ 12:15) (107/72 - 130/66)  RR: 18 (04-05-23 @ 12:15) (16 - 18)  SpO2: 98% (04-05-23 @ 12:15) (97% - 100%)  Wt(kg): --  I&O's Summary    Height (cm): 170.2 (04-05 @ 06:15)  Weight (kg): 63.6 (04-05 @ 06:15)  BMI (kg/m2): 22 (04-05 @ 06:15)  BSA (m2): 1.74 (04-05 @ 06:15)    GENERAL: NAD  EYES: EOMI, PERRLA, conjunctiva and sclera clear  ENMT: No tonsillar erythema, exudates, or enlargement  Cardiovascular: Normal S1 S2, No JVD, No murmurs, No edema  Respiratory: Lungs clear to auscultation	  Gastrointestinal:  Soft, Non-tender, + BS	  Extremities: No edema                                     8.7    4.92  )-----------( 159      ( 05 Apr 2023 05:30 )             27.1     04-05    142  |  109<H>  |  20  ----------------------------<  94  3.6   |  23  |  1.02    Ca    8.4      05 Apr 2023 05:30  Phos  2.9     04-05  Mg     2.10     04-05    TPro  6.1  /  Alb  3.7  /  TBili  0.4  /  DBili  x   /  AST  19  /  ALT  26  /  AlkPhos  46  04-05    proBNP:   Lipid Profile:   HgA1c:   TSH:     Consultant(s) Notes Reviewed:  [x ] YES  [ ] NO    Care Discussed with Consultants/Other Providers [ x] YES  [ ] NO    Imaging Personally Reviewed independently:  [x] YES  [ ] NO    All labs, radiologic studies, vitals, orders and medications list reviewed. Patient is seen and examined at bedside. Case discussed with medical team.

## 2023-04-05 NOTE — H&P ADULT - PROBLEM SELECTOR PLAN 3
last PCI 2021; on DAPT  - Cards consulted; ok to hold Clopidogrel for procedure  - no contraindication to restart ASA after procedure  - will ask cards about continuing to hold Clopidogrel last PCI 2021; on DAPT  - Cards consulted; ok to hold Clopidogrel for procedure  - no contraindication to restart ASA after procedure

## 2023-04-05 NOTE — CONSULT NOTE ADULT - ATTENDING COMMENTS
66-year-old male with HTN/HLD/DM2/CAD status post PCI on aspirin/Plavix presents with 5 days of melena, burning epigastric pain.  Patient found to have hemoglobin 9.6 on admission, down to 8.7.  No history of upper endoscopy.  Impression  Likely peptic ulcer disease, rule out NSAID gastropathy, rule out gastric malignancy    Recommendation  1.  PPI IV twice daily  2.  N.p.o.  3.  EGD today

## 2023-04-05 NOTE — ED ADULT NURSE REASSESSMENT NOTE - NS ED NURSE REASSESS COMMENT FT1
Report received from CINDY Wynn. A&Ox4 and amb with walker at baseline. Pt offers no complaints at this time. Speaking in full and complete sentences. Range between sinus mimi and NSR on bedside cardiac monitor. Respirations even and unlabored. No acute distress noted. Denies CP, abd pain, SOB, nausea, vomiting, headahce, light-headedness, vision changes, fever or chills. Awaiting bed assignment as per admission orders.

## 2023-04-05 NOTE — ED PROVIDER NOTE - CLINICAL SUMMARY MEDICAL DECISION MAKING FREE TEXT BOX
67-year-old male past medical history HTN HLD DM CAD status post stents on aspirin Plavix presents with 5 days of dark black stool. vss. benign abd exam.    No history of varices  liver issues. Concern for upper GI bleed vs lower GI bleed.   Plan Labs stool occult IVF meds and likely admission.

## 2023-04-06 LAB
A1C WITH ESTIMATED AVERAGE GLUCOSE RESULT: 6.8 % — HIGH (ref 4–5.6)
ALBUMIN SERPL ELPH-MCNC: 3.4 G/DL — SIGNIFICANT CHANGE UP (ref 3.3–5)
ALP SERPL-CCNC: 45 U/L — SIGNIFICANT CHANGE UP (ref 40–120)
ALT FLD-CCNC: 24 U/L — SIGNIFICANT CHANGE UP (ref 4–41)
ANION GAP SERPL CALC-SCNC: 8 MMOL/L — SIGNIFICANT CHANGE UP (ref 7–14)
AST SERPL-CCNC: 18 U/L — SIGNIFICANT CHANGE UP (ref 4–40)
BILIRUB SERPL-MCNC: 0.4 MG/DL — SIGNIFICANT CHANGE UP (ref 0.2–1.2)
BUN SERPL-MCNC: 21 MG/DL — SIGNIFICANT CHANGE UP (ref 7–23)
CALCIUM SERPL-MCNC: 8.4 MG/DL — SIGNIFICANT CHANGE UP (ref 8.4–10.5)
CHLORIDE SERPL-SCNC: 107 MMOL/L — SIGNIFICANT CHANGE UP (ref 98–107)
CO2 SERPL-SCNC: 23 MMOL/L — SIGNIFICANT CHANGE UP (ref 22–31)
CREAT SERPL-MCNC: 0.94 MG/DL — SIGNIFICANT CHANGE UP (ref 0.5–1.3)
EGFR: 88 ML/MIN/1.73M2 — SIGNIFICANT CHANGE UP
ESTIMATED AVERAGE GLUCOSE: 148 — SIGNIFICANT CHANGE UP
GLUCOSE BLDC GLUCOMTR-MCNC: 142 MG/DL — HIGH (ref 70–99)
GLUCOSE BLDC GLUCOMTR-MCNC: 168 MG/DL — HIGH (ref 70–99)
GLUCOSE BLDC GLUCOMTR-MCNC: 185 MG/DL — HIGH (ref 70–99)
GLUCOSE BLDC GLUCOMTR-MCNC: 199 MG/DL — HIGH (ref 70–99)
GLUCOSE SERPL-MCNC: 173 MG/DL — HIGH (ref 70–99)
H PYLORI AB SER-ACNC: 41.2 UNITS — HIGH
HCT VFR BLD CALC: 24.4 % — LOW (ref 39–50)
HCT VFR BLD CALC: 24.6 % — LOW (ref 39–50)
HGB BLD-MCNC: 7.8 G/DL — LOW (ref 13–17)
HGB BLD-MCNC: 8 G/DL — LOW (ref 13–17)
MAGNESIUM SERPL-MCNC: 2.1 MG/DL — SIGNIFICANT CHANGE UP (ref 1.6–2.6)
MCHC RBC-ENTMCNC: 28.6 PG — SIGNIFICANT CHANGE UP (ref 27–34)
MCHC RBC-ENTMCNC: 28.8 PG — SIGNIFICANT CHANGE UP (ref 27–34)
MCHC RBC-ENTMCNC: 32 GM/DL — SIGNIFICANT CHANGE UP (ref 32–36)
MCHC RBC-ENTMCNC: 32.5 GM/DL — SIGNIFICANT CHANGE UP (ref 32–36)
MCV RBC AUTO: 88.5 FL — SIGNIFICANT CHANGE UP (ref 80–100)
MCV RBC AUTO: 89.4 FL — SIGNIFICANT CHANGE UP (ref 80–100)
NRBC # BLD: 0 /100 WBCS — SIGNIFICANT CHANGE UP (ref 0–0)
NRBC # BLD: 0 /100 WBCS — SIGNIFICANT CHANGE UP (ref 0–0)
NRBC # FLD: 0 K/UL — SIGNIFICANT CHANGE UP (ref 0–0)
NRBC # FLD: 0 K/UL — SIGNIFICANT CHANGE UP (ref 0–0)
PHOSPHATE SERPL-MCNC: 2.3 MG/DL — LOW (ref 2.5–4.5)
PLATELET # BLD AUTO: 157 K/UL — SIGNIFICANT CHANGE UP (ref 150–400)
PLATELET # BLD AUTO: 169 K/UL — SIGNIFICANT CHANGE UP (ref 150–400)
POTASSIUM SERPL-MCNC: 4.1 MMOL/L — SIGNIFICANT CHANGE UP (ref 3.5–5.3)
POTASSIUM SERPL-SCNC: 4.1 MMOL/L — SIGNIFICANT CHANGE UP (ref 3.5–5.3)
PROLACTIN SERPL-MCNC: 6.5 NG/ML — SIGNIFICANT CHANGE UP (ref 4.1–18.4)
PROT SERPL-MCNC: 5.6 G/DL — LOW (ref 6–8.3)
RBC # BLD: 2.73 M/UL — LOW (ref 4.2–5.8)
RBC # BLD: 2.78 M/UL — LOW (ref 4.2–5.8)
RBC # FLD: 13.3 % — SIGNIFICANT CHANGE UP (ref 10.3–14.5)
RBC # FLD: 13.3 % — SIGNIFICANT CHANGE UP (ref 10.3–14.5)
SODIUM SERPL-SCNC: 138 MMOL/L — SIGNIFICANT CHANGE UP (ref 135–145)
WBC # BLD: 5.97 K/UL — SIGNIFICANT CHANGE UP (ref 3.8–10.5)
WBC # BLD: 7.64 K/UL — SIGNIFICANT CHANGE UP (ref 3.8–10.5)
WBC # FLD AUTO: 5.97 K/UL — SIGNIFICANT CHANGE UP (ref 3.8–10.5)
WBC # FLD AUTO: 7.64 K/UL — SIGNIFICANT CHANGE UP (ref 3.8–10.5)

## 2023-04-06 PROCEDURE — 73080 X-RAY EXAM OF ELBOW: CPT | Mod: 26,RT

## 2023-04-06 PROCEDURE — 99232 SBSQ HOSP IP/OBS MODERATE 35: CPT | Mod: GC

## 2023-04-06 PROCEDURE — 70450 CT HEAD/BRAIN W/O DYE: CPT | Mod: 26

## 2023-04-06 RX ADMIN — PANTOPRAZOLE SODIUM 40 MILLIGRAM(S): 20 TABLET, DELAYED RELEASE ORAL at 05:22

## 2023-04-06 RX ADMIN — PANTOPRAZOLE SODIUM 40 MILLIGRAM(S): 20 TABLET, DELAYED RELEASE ORAL at 17:19

## 2023-04-06 RX ADMIN — Medication 1: at 18:07

## 2023-04-06 RX ADMIN — Medication 1: at 12:59

## 2023-04-06 NOTE — PROGRESS NOTE ADULT - SUBJECTIVE AND OBJECTIVE BOX
PATIENT: TETE NICHOLAS, MRN: [id number]    CHIEF COMPLAINT: Patient is a 67y old  Male who presents with a chief complaint of Melena (05 Apr 2023 09:31)      INTERVAL HISTORY/OVERNIGHT EVENTS: RRT called 4/5 for fall i/s/o lightheadedness, likely from dehydration s/p 1L IVF. Pt with slight headache after, CTH ordered and elbow pain - Xray ordered. Reproducible chest pain overnight, trops and EKG WNL.         MEDICATIONS:  MEDICATIONS  (STANDING):  dextrose 5%. 1000 milliLiter(s) (50 mL/Hr) IV Continuous <Continuous>  dextrose 5%. 1000 milliLiter(s) (100 mL/Hr) IV Continuous <Continuous>  dextrose 50% Injectable 25 Gram(s) IV Push once  dextrose 50% Injectable 12.5 Gram(s) IV Push once  dextrose 50% Injectable 25 Gram(s) IV Push once  glucagon  Injectable 1 milliGRAM(s) IntraMuscular once  insulin lispro (ADMELOG) corrective regimen sliding scale   SubCutaneous three times a day before meals  insulin lispro (ADMELOG) corrective regimen sliding scale   SubCutaneous at bedtime  pantoprazole  Injectable 40 milliGRAM(s) IV Push every 12 hours    MEDICATIONS  (PRN):  aluminum hydroxide/magnesium hydroxide/simethicone Suspension 30 milliLiter(s) Oral every 4 hours PRN Dyspepsia  dextrose Oral Gel 15 Gram(s) Oral once PRN Blood Glucose LESS THAN 70 milliGRAM(s)/deciliter  melatonin 3 milliGRAM(s) Oral at bedtime PRN Insomnia  ondansetron Injectable 4 milliGRAM(s) IV Push every 8 hours PRN Nausea and/or Vomiting      ALLERGIES: Allergies    No Known Allergies    Intolerances        OBJECTIVE:  ICU Vital Signs Last 24 Hrs  T(C): 36.7 (06 Apr 2023 06:03), Max: 36.7 (06 Apr 2023 06:03)  T(F): 98 (06 Apr 2023 06:03), Max: 98 (06 Apr 2023 06:03)  HR: 75 (06 Apr 2023 06:03) (58 - 82)  BP: 124/61 (06 Apr 2023 06:03) (114/60 - 145/87)  BP(mean): 98 (05 Apr 2023 13:15) (98 - 98)  ABP: --  ABP(mean): --  RR: 18 (06 Apr 2023 06:03) (14 - 18)  SpO2: 99% (06 Apr 2023 06:03) (98% - 100%)    O2 Parameters below as of 06 Apr 2023 06:03  Patient On (Oxygen Delivery Method): room air            Adult Advanced Hemodynamics Last 24 Hrs  CVP(mm Hg): --  CVP(cm H2O): --  CO: --  CI: --  PA: --  PA(mean): --  PCWP: --  SVR: --  SVRI: --  PVR: --  PVRI: --  CAPILLARY BLOOD GLUCOSE      POCT Blood Glucose.: 131 mg/dL (05 Apr 2023 21:20)  POCT Blood Glucose.: 158 mg/dL (05 Apr 2023 18:32)  POCT Blood Glucose.: 173 mg/dL (05 Apr 2023 17:57)  POCT Blood Glucose.: 120 mg/dL (05 Apr 2023 14:49)  POCT Blood Glucose.: 156 mg/dL (05 Apr 2023 12:13)    CAPILLARY BLOOD GLUCOSE      POCT Blood Glucose.: 131 mg/dL (05 Apr 2023 21:20)    I&O's Summary    05 Apr 2023 07:01  -  06 Apr 2023 07:00  --------------------------------------------------------  IN: 0 mL / OUT: 0 mL / NET: 0 mL      Daily Height in cm: 167.64 (05 Apr 2023 12:15)    Daily     PHYSICAL EXAMINATION:  GENERAL: NAD, lying comfortably in bed   HEAD:  Atraumatic, Normocephalic  EYES: EOMI b/l, PERRLA b/l, conjunctiva and sclera clear  NECK: Supple, No JVD, No LAD   CHEST/LUNG: Clear to auscultation bilaterally; No wheeze or rhonchi  HEART: Regular rate and rhythm; S1 and S2 present, No murmurs, rubs, or gallops  ABDOMEN: Soft, Nontender, Nondistended; Bowel sounds present  EXTREMITIES:  2+ Peripheral Pulses, No clubbing, cyanosis, or edema  NEURO: AAOx3, non-focal   SKIN: No rashes or lesions    LABS:                          8.0    5.97  )-----------( 157      ( 06 Apr 2023 02:40 )             24.6     04-06    138  |  107  |  21  ----------------------------<  173<H>  4.1   |  23  |  0.94    Ca    8.4      06 Apr 2023 02:40  Phos  2.3     04-06  Mg     2.10     04-06    TPro  5.6<L>  /  Alb  3.4  /  TBili  0.4  /  DBili  x   /  AST  18  /  ALT  24  /  AlkPhos  45  04-06    LIVER FUNCTIONS - ( 06 Apr 2023 02:40 )  Alb: 3.4 g/dL / Pro: 5.6 g/dL / ALK PHOS: 45 U/L / ALT: 24 U/L / AST: 18 U/L / GGT: x           PT/INR - ( 05 Apr 2023 05:30 )   PT: 13.4 sec;   INR: 1.15 ratio         PTT - ( 05 Apr 2023 05:30 )  PTT:29.3 sec         PATIENT: TETE NICHOLAS, MRN: [id number]    CHIEF COMPLAINT: Patient is a 67y old  Male who presents with a chief complaint of Melena (05 Apr 2023 09:31)      INTERVAL HISTORY/OVERNIGHT EVENTS: RRT called 4/5 for fall i/s/o lightheadedness, likely from dehydration s/p 1L IVF. Pt with slight headache after, CTH ordered and elbow pain - Xray ordered. Reproducible chest pain overnight, trops and EKG WNL. Patient reports that he had one episode of melena overnight. Denies any acute complaints at bedside, denies chest pain, headaches, lightheadedness.         MEDICATIONS:  MEDICATIONS  (STANDING):  dextrose 5%. 1000 milliLiter(s) (50 mL/Hr) IV Continuous <Continuous>  dextrose 5%. 1000 milliLiter(s) (100 mL/Hr) IV Continuous <Continuous>  dextrose 50% Injectable 25 Gram(s) IV Push once  dextrose 50% Injectable 12.5 Gram(s) IV Push once  dextrose 50% Injectable 25 Gram(s) IV Push once  glucagon  Injectable 1 milliGRAM(s) IntraMuscular once  insulin lispro (ADMELOG) corrective regimen sliding scale   SubCutaneous three times a day before meals  insulin lispro (ADMELOG) corrective regimen sliding scale   SubCutaneous at bedtime  pantoprazole  Injectable 40 milliGRAM(s) IV Push every 12 hours    MEDICATIONS  (PRN):  aluminum hydroxide/magnesium hydroxide/simethicone Suspension 30 milliLiter(s) Oral every 4 hours PRN Dyspepsia  dextrose Oral Gel 15 Gram(s) Oral once PRN Blood Glucose LESS THAN 70 milliGRAM(s)/deciliter  melatonin 3 milliGRAM(s) Oral at bedtime PRN Insomnia  ondansetron Injectable 4 milliGRAM(s) IV Push every 8 hours PRN Nausea and/or Vomiting      ALLERGIES: Allergies    No Known Allergies    Intolerances        OBJECTIVE:  ICU Vital Signs Last 24 Hrs  T(C): 36.7 (06 Apr 2023 06:03), Max: 36.7 (06 Apr 2023 06:03)  T(F): 98 (06 Apr 2023 06:03), Max: 98 (06 Apr 2023 06:03)  HR: 75 (06 Apr 2023 06:03) (58 - 82)  BP: 124/61 (06 Apr 2023 06:03) (114/60 - 145/87)  BP(mean): 98 (05 Apr 2023 13:15) (98 - 98)  ABP: --  ABP(mean): --  RR: 18 (06 Apr 2023 06:03) (14 - 18)  SpO2: 99% (06 Apr 2023 06:03) (98% - 100%)    O2 Parameters below as of 06 Apr 2023 06:03  Patient On (Oxygen Delivery Method): room air            Adult Advanced Hemodynamics Last 24 Hrs  CVP(mm Hg): --  CVP(cm H2O): --  CO: --  CI: --  PA: --  PA(mean): --  PCWP: --  SVR: --  SVRI: --  PVR: --  PVRI: --  CAPILLARY BLOOD GLUCOSE      POCT Blood Glucose.: 131 mg/dL (05 Apr 2023 21:20)  POCT Blood Glucose.: 158 mg/dL (05 Apr 2023 18:32)  POCT Blood Glucose.: 173 mg/dL (05 Apr 2023 17:57)  POCT Blood Glucose.: 120 mg/dL (05 Apr 2023 14:49)  POCT Blood Glucose.: 156 mg/dL (05 Apr 2023 12:13)    CAPILLARY BLOOD GLUCOSE      POCT Blood Glucose.: 131 mg/dL (05 Apr 2023 21:20)    I&O's Summary    05 Apr 2023 07:01  -  06 Apr 2023 07:00  --------------------------------------------------------  IN: 0 mL / OUT: 0 mL / NET: 0 mL      Daily Height in cm: 167.64 (05 Apr 2023 12:15)    Daily     PHYSICAL EXAMINATION:  GENERAL: NAD, lying comfortably in bed   HEAD:  Atraumatic, Normocephalic  EYES: EOMI b/l, PERRLA b/l, conjunctiva and sclera clear  NECK: Supple, No JVD, No LAD   CHEST/LUNG: Clear to auscultation bilaterally; No wheeze or rhonchi  HEART: Regular rate and rhythm; S1 and S2 present, No murmurs, rubs, or gallops  ABDOMEN: Soft, Nontender, Nondistended; Bowel sounds present  EXTREMITIES:  2+ Peripheral Pulses, No clubbing, cyanosis, or edema  NEURO: AAOx3, non-focal   SKIN: No rashes or lesions    LABS:                          8.0    5.97  )-----------( 157      ( 06 Apr 2023 02:40 )             24.6     04-06    138  |  107  |  21  ----------------------------<  173<H>  4.1   |  23  |  0.94    Ca    8.4      06 Apr 2023 02:40  Phos  2.3     04-06  Mg     2.10     04-06    TPro  5.6<L>  /  Alb  3.4  /  TBili  0.4  /  DBili  x   /  AST  18  /  ALT  24  /  AlkPhos  45  04-06    LIVER FUNCTIONS - ( 06 Apr 2023 02:40 )  Alb: 3.4 g/dL / Pro: 5.6 g/dL / ALK PHOS: 45 U/L / ALT: 24 U/L / AST: 18 U/L / GGT: x           PT/INR - ( 05 Apr 2023 05:30 )   PT: 13.4 sec;   INR: 1.15 ratio         PTT - ( 05 Apr 2023 05:30 )  PTT:29.3 sec

## 2023-04-06 NOTE — PROGRESS NOTE ADULT - SUBJECTIVE AND OBJECTIVE BOX
Interval Events:   resting   no acute events overnight    Hospital Medications:  aluminum hydroxide/magnesium hydroxide/simethicone Suspension 30 milliLiter(s) Oral every 4 hours PRN  dextrose 5%. 1000 milliLiter(s) IV Continuous <Continuous>  dextrose 5%. 1000 milliLiter(s) IV Continuous <Continuous>  dextrose 50% Injectable 25 Gram(s) IV Push once  dextrose 50% Injectable 12.5 Gram(s) IV Push once  dextrose 50% Injectable 25 Gram(s) IV Push once  dextrose Oral Gel 15 Gram(s) Oral once PRN  glucagon  Injectable 1 milliGRAM(s) IntraMuscular once  insulin lispro (ADMELOG) corrective regimen sliding scale   SubCutaneous three times a day before meals  insulin lispro (ADMELOG) corrective regimen sliding scale   SubCutaneous at bedtime  melatonin 3 milliGRAM(s) Oral at bedtime PRN  ondansetron Injectable 4 milliGRAM(s) IV Push every 8 hours PRN  pantoprazole  Injectable 40 milliGRAM(s) IV Push every 12 hours      ROS: All system reviewed and negative except as mentioned above.    PHYSICAL EXAM:   Vital Signs:  Vital Signs Last 24 Hrs  T(C): 36.7 (06 Apr 2023 06:03), Max: 36.7 (06 Apr 2023 06:03)  T(F): 98 (06 Apr 2023 06:03), Max: 98 (06 Apr 2023 06:03)  HR: 75 (06 Apr 2023 06:03) (58 - 82)  BP: 124/61 (06 Apr 2023 06:03) (114/60 - 145/87)  BP(mean): 98 (05 Apr 2023 13:15) (98 - 98)  RR: 18 (06 Apr 2023 06:03) (14 - 18)  SpO2: 99% (06 Apr 2023 06:03) (98% - 100%)    Parameters below as of 06 Apr 2023 06:03  Patient On (Oxygen Delivery Method): room air      Daily Height in cm: 167.64 (05 Apr 2023 12:15)    Daily     GENERAL:  NAD, Appears stated age  HEENT:  NC/AT,  conjunctivae clear and pink, sclera -anicteric  CHEST:  Normal Effort, Breath sounds clear  HEART:  RRR, S1 + S2, no murmurs  ABDOMEN:  Soft, non-tender, non-distended, normoactive bowel sounds,  no masses  EXTREMITIES:  no cyanosis or edema  SKIN:  Warm & Dry. No rash or erythema  NEURO:  Alert, oriented, no focal deficit    LABS:                        8.0    5.97  )-----------( 157      ( 06 Apr 2023 02:40 )             24.6     Mean Cell Volume: 88.5 fL (04-06-23 @ 02:40)    04-06    138  |  107  |  21  ----------------------------<  173<H>  4.1   |  23  |  0.94    Ca    8.4      06 Apr 2023 02:40  Phos  2.3     04-06  Mg     2.10     04-06    TPro  5.6<L>  /  Alb  3.4  /  TBili  0.4  /  DBili  x   /  AST  18  /  ALT  24  /  AlkPhos  45  04-06    LIVER FUNCTIONS - ( 06 Apr 2023 02:40 )  Alb: 3.4 g/dL / Pro: 5.6 g/dL / ALK PHOS: 45 U/L / ALT: 24 U/L / AST: 18 U/L / GGT: x           PT/INR - ( 05 Apr 2023 05:30 )   PT: 13.4 sec;   INR: 1.15 ratio         PTT - ( 05 Apr 2023 05:30 )  PTT:29.3 sec                            8.0    5.97  )-----------( 157      ( 06 Apr 2023 02:40 )             24.6                         9.2    6.62  )-----------( 158      ( 05 Apr 2023 16:43 )             27.8                         8.7    4.92  )-----------( 159      ( 05 Apr 2023 05:30 )             27.1                         9.6    5.50  )-----------( 170      ( 05 Apr 2023 00:59 )             29.3       Imaging: Images reviewed.

## 2023-04-06 NOTE — PROGRESS NOTE ADULT - SUBJECTIVE AND OBJECTIVE BOX
ANESTHESIA POSTOP CHECK    67y Male POSTOP DAY 1 S/P     Vital Signs Last 24 Hrs  T(C): 36.7 (06 Apr 2023 06:03), Max: 36.7 (06 Apr 2023 06:03)  T(F): 98 (06 Apr 2023 06:03), Max: 98 (06 Apr 2023 06:03)  HR: 75 (06 Apr 2023 06:03) (58 - 82)  BP: 124/61 (06 Apr 2023 06:03) (114/60 - 145/87)  BP(mean): 98 (05 Apr 2023 13:15) (98 - 98)  RR: 18 (06 Apr 2023 06:03) (14 - 18)  SpO2: 99% (06 Apr 2023 06:03) (98% - 100%)    Parameters below as of 06 Apr 2023 06:03  Patient On (Oxygen Delivery Method): room air      I&O's Summary    05 Apr 2023 07:01  -  06 Apr 2023 07:00  --------------------------------------------------------  IN: 0 mL / OUT: 0 mL / NET: 0 mL        [x ] NO APPARENT ANESTHESIA COMPLICATIONS      Comments:

## 2023-04-06 NOTE — PROGRESS NOTE ADULT - ASSESSMENT
67-year-old male past medical history HTN HLD DM CAD status post stents on aspirin/Plavix presents with 5 days of dark black stool associated with epigastric pain described as burning.  No similar history of dark stool.  Has never seen a GI doctor or EGD.  Last colonoscopy over 7 years ago.  No nausea vomiting.  No chest pain shortness of breath fevers chills. Hb found 9.6 on admission now down to 8.7. GI called for further evaluation.       #Gastric ulcer  - Non-bleeding gastric ulcer with a visible vessel s/p Clip                       #CAD s/p PCI on DAPT  #HTN  #HLD    Recommendations:  -Continue on pantoprazole 40 mg BID x 8 weeks  -Please obtain serologic or stool H pylori testing and  treat if positive.  -No further work up from our perspective     Recommendations preliminary until signed by attending.     Garo Huston MD  Gastroenterology/Hepatology Fellow  1st option: [phone] (text or call), ONLY available from 7:00 am to 5:00 pm.   **Contact on-call GI fellow via answering service ([phone]) from 5pm-7am AND on weekends/holidays**  2nd option: Available via [employer] Teams  3rd option: Pager: [phone]

## 2023-04-06 NOTE — PROGRESS NOTE ADULT - PROBLEM SELECTOR PLAN 1
Multiple episodes of dark stools associated w/ burning epigastric pain concerning for GIB.  Iron studies WNL   GI consulted for EGD, found to have multiple non-bleeding ulcers   Per cards, can hold plavix for now   - c/w PPI q12h  - No CI to restart ASA   - restart diet   - H pylori stool antigen and serology ordered

## 2023-04-06 NOTE — PROGRESS NOTE ADULT - ASSESSMENT
65yo M w/ pmhx HTN, DM, HLD, CAD s/p stents (2021) on DAPT presented w/ 5 day hx of daily black stools admitted for further work up of GIB. EGD with multiple non-bleeding ulcers, once vessel clipped and Hgb stable. RRT called 4/5 for fall i/s/o lightheadedness, likely from dehydration s/p 1L IVF.

## 2023-04-06 NOTE — PROGRESS NOTE ADULT - PROBLEM SELECTOR PLAN 3
last PCI 2021; on DAPT  - Cards consulted; ok to hold Clopidogrel for procedure  - no contraindication to restart ASA after procedure last PCI 2021; on DAPT  - Cards consulted; ok to hold plavix  - no contraindication to restart ASA after procedure

## 2023-04-06 NOTE — PROGRESS NOTE ADULT - ATTENDING COMMENTS
66M HTN, DM2, HLD, CAD s/p PCI (2021) on DAPT who presents with 5 days of black stools concerning for UGIB.     # UGIB- s/p EGD on 4/5 which showed ulcer with non-bleeding vessel that was clipped. H. pylori studies pending and can be followed up as outpatient. c/w PPI BID x 8 weeks. Monitor CBC  # Fall - occurred immediately after returning from endoscopy. CTH and XR shoulder negative. Will obtain PT consult.

## 2023-04-07 ENCOUNTER — TRANSCRIPTION ENCOUNTER (OUTPATIENT)
Age: 68
End: 2023-04-07
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
## 2023-04-07 LAB
ALBUMIN SERPL ELPH-MCNC: 3.4 G/DL — SIGNIFICANT CHANGE UP (ref 3.3–5)
ALP SERPL-CCNC: 44 U/L — SIGNIFICANT CHANGE UP (ref 40–120)
ALT FLD-CCNC: 17 U/L — SIGNIFICANT CHANGE UP (ref 4–41)
ANION GAP SERPL CALC-SCNC: 8 MMOL/L — SIGNIFICANT CHANGE UP (ref 7–14)
AST SERPL-CCNC: 13 U/L — SIGNIFICANT CHANGE UP (ref 4–40)
BILIRUB SERPL-MCNC: 0.2 MG/DL — SIGNIFICANT CHANGE UP (ref 0.2–1.2)
BUN SERPL-MCNC: 22 MG/DL — SIGNIFICANT CHANGE UP (ref 7–23)
CALCIUM SERPL-MCNC: 8.3 MG/DL — LOW (ref 8.4–10.5)
CHLORIDE SERPL-SCNC: 106 MMOL/L — SIGNIFICANT CHANGE UP (ref 98–107)
CO2 SERPL-SCNC: 24 MMOL/L — SIGNIFICANT CHANGE UP (ref 22–31)
CREAT SERPL-MCNC: 0.9 MG/DL — SIGNIFICANT CHANGE UP (ref 0.5–1.3)
EGFR: 92 ML/MIN/1.73M2 — SIGNIFICANT CHANGE UP
GLUCOSE BLDC GLUCOMTR-MCNC: 166 MG/DL — HIGH (ref 70–99)
GLUCOSE SERPL-MCNC: 199 MG/DL — HIGH (ref 70–99)
H PYLORI AG STL QL: POSITIVE
HCT VFR BLD CALC: 18.7 % — CRITICAL LOW (ref 39–50)
HCT VFR BLD CALC: 20.9 % — CRITICAL LOW (ref 39–50)
HCT VFR BLD CALC: 27.1 % — LOW (ref 39–50)
HGB BLD-MCNC: 6.3 G/DL — CRITICAL LOW (ref 13–17)
HGB BLD-MCNC: 7 G/DL — CRITICAL LOW (ref 13–17)
HGB BLD-MCNC: 9.1 G/DL — LOW (ref 13–17)
MAGNESIUM SERPL-MCNC: 2 MG/DL — SIGNIFICANT CHANGE UP (ref 1.6–2.6)
MCHC RBC-ENTMCNC: 28.7 PG — SIGNIFICANT CHANGE UP (ref 27–34)
MCHC RBC-ENTMCNC: 29.7 PG — SIGNIFICANT CHANGE UP (ref 27–34)
MCHC RBC-ENTMCNC: 30.6 PG — SIGNIFICANT CHANGE UP (ref 27–34)
MCHC RBC-ENTMCNC: 33.5 GM/DL — SIGNIFICANT CHANGE UP (ref 32–36)
MCHC RBC-ENTMCNC: 33.6 GM/DL — SIGNIFICANT CHANGE UP (ref 32–36)
MCHC RBC-ENTMCNC: 33.7 GM/DL — SIGNIFICANT CHANGE UP (ref 32–36)
MCV RBC AUTO: 85.5 FL — SIGNIFICANT CHANGE UP (ref 80–100)
MCV RBC AUTO: 88.2 FL — SIGNIFICANT CHANGE UP (ref 80–100)
MCV RBC AUTO: 91.3 FL — SIGNIFICANT CHANGE UP (ref 80–100)
NRBC # BLD: 0 /100 WBCS — SIGNIFICANT CHANGE UP (ref 0–0)
NRBC # FLD: 0.02 K/UL — HIGH (ref 0–0)
NRBC # FLD: 0.02 K/UL — HIGH (ref 0–0)
NRBC # FLD: 0.03 K/UL — HIGH (ref 0–0)
PHOSPHATE SERPL-MCNC: 2.2 MG/DL — LOW (ref 2.5–4.5)
PLATELET # BLD AUTO: 143 K/UL — LOW (ref 150–400)
PLATELET # BLD AUTO: 144 K/UL — LOW (ref 150–400)
PLATELET # BLD AUTO: 157 K/UL — SIGNIFICANT CHANGE UP (ref 150–400)
POTASSIUM SERPL-MCNC: 3.6 MMOL/L — SIGNIFICANT CHANGE UP (ref 3.5–5.3)
POTASSIUM SERPL-SCNC: 3.6 MMOL/L — SIGNIFICANT CHANGE UP (ref 3.5–5.3)
PROT SERPL-MCNC: 5.2 G/DL — LOW (ref 6–8.3)
RBC # BLD: 2.12 M/UL — LOW (ref 4.2–5.8)
RBC # BLD: 2.29 M/UL — LOW (ref 4.2–5.8)
RBC # BLD: 3.17 M/UL — LOW (ref 4.2–5.8)
RBC # FLD: 13.9 % — SIGNIFICANT CHANGE UP (ref 10.3–14.5)
RBC # FLD: 13.9 % — SIGNIFICANT CHANGE UP (ref 10.3–14.5)
RBC # FLD: 15 % — HIGH (ref 10.3–14.5)
SODIUM SERPL-SCNC: 138 MMOL/L — SIGNIFICANT CHANGE UP (ref 135–145)
WBC # BLD: 6.35 K/UL — SIGNIFICANT CHANGE UP (ref 3.8–10.5)
WBC # BLD: 7.35 K/UL — SIGNIFICANT CHANGE UP (ref 3.8–10.5)
WBC # BLD: 8.08 K/UL — SIGNIFICANT CHANGE UP (ref 3.8–10.5)
WBC # FLD AUTO: 6.35 K/UL — SIGNIFICANT CHANGE UP (ref 3.8–10.5)
WBC # FLD AUTO: 7.35 K/UL — SIGNIFICANT CHANGE UP (ref 3.8–10.5)
WBC # FLD AUTO: 8.08 K/UL — SIGNIFICANT CHANGE UP (ref 3.8–10.5)

## 2023-04-07 PROCEDURE — 99233 SBSQ HOSP IP/OBS HIGH 50: CPT | Mod: GC

## 2023-04-07 PROCEDURE — 43255 EGD CONTROL BLEEDING ANY: CPT | Mod: GC

## 2023-04-07 RX ORDER — PANTOPRAZOLE SODIUM 20 MG/1
8 TABLET, DELAYED RELEASE ORAL
Qty: 80 | Refills: 0 | Status: DISCONTINUED | OUTPATIENT
Start: 2023-04-07 | End: 2023-04-08

## 2023-04-07 RX ORDER — METRONIDAZOLE 500 MG
250 TABLET ORAL
Refills: 0 | Status: DISCONTINUED | OUTPATIENT
Start: 2023-04-07 | End: 2023-04-09

## 2023-04-07 RX ORDER — IRON SUCROSE 20 MG/ML
200 INJECTION, SOLUTION INTRAVENOUS ONCE
Refills: 0 | Status: COMPLETED | OUTPATIENT
Start: 2023-04-07 | End: 2023-04-07

## 2023-04-07 RX ADMIN — Medication 500 MILLIGRAM(S): at 19:01

## 2023-04-07 RX ADMIN — Medication 1: at 13:04

## 2023-04-07 RX ADMIN — Medication 30 MILLILITER(S): at 18:55

## 2023-04-07 RX ADMIN — PANTOPRAZOLE SODIUM 40 MILLIGRAM(S): 20 TABLET, DELAYED RELEASE ORAL at 06:09

## 2023-04-07 RX ADMIN — Medication 250 MILLIGRAM(S): at 23:04

## 2023-04-07 RX ADMIN — Medication 1: at 08:47

## 2023-04-07 RX ADMIN — PANTOPRAZOLE SODIUM 10 MG/HR: 20 TABLET, DELAYED RELEASE ORAL at 19:21

## 2023-04-07 RX ADMIN — Medication 250 MILLIGRAM(S): at 18:54

## 2023-04-07 RX ADMIN — IRON SUCROSE 200 MILLIGRAM(S): 20 INJECTION, SOLUTION INTRAVENOUS at 13:12

## 2023-04-07 RX ADMIN — Medication 500 MILLIGRAM(S): at 23:04

## 2023-04-07 NOTE — DISCHARGE NOTE PROVIDER - CARE PROVIDER_API CALL
[facility]  Internal Medicine  [address] [place] [address]  Phone: [phone]  Fax: [phone]  Established Patient  Follow Up Time: 2 weeks

## 2023-04-07 NOTE — PROGRESS NOTE ADULT - ATTENDING COMMENTS
s/p EGD with ulcer with red spot/vv s/p clip  No further bleed  4/7- this AM, hgb drop to 7, repeat at 2 PM- 6.3 with dark stool  patient ate breakfast this AM    Imp: recurrent UGIB    Rec:  will need resuscitation with 2 units   PPI gtt  NPO  EGD

## 2023-04-07 NOTE — DISCHARGE NOTE PROVIDER - NSFOLLOWUPCLINICS_GEN_ALL_ED_FT
Gastroenterology at [facility]  Gastroenterology  [address] [place] [id number]  Phone: [phone]  Fax:   Follow Up Time: 2 weeks

## 2023-04-07 NOTE — PROGRESS NOTE ADULT - PROBLEM SELECTOR PLAN 1
Multiple episodes of dark stools associated w/ burning epigastric pain concerning for GIB.  Iron studies WNL   GI consulted for EGD, found to have multiple non-bleeding ulcers   Per cards, can hold plavix for now   - c/w PPI q12h  - No CI to restart ASA   - restart diet   - H pylori stool antigen and serology obtained Multiple episodes of dark stools associated w/ burning epigastric pain concerning for GIB.  Iron studies WNL   GI consulted for EGD, found to have multiple non-bleeding ulcers   Per cards, can hold plavix for now   Plans for repeat EGD i/s/o continued melena and downtrending Hgb   - CBC q6 hrs   - c/w PPI q12h  - No CI to restart ASA   - H pylori stool serology positive, pending antigen

## 2023-04-07 NOTE — PACU DISCHARGE NOTE - COMMENTS
T(C): 36.2 (04-05-23 @ 14:30), Max: 36.6 (04-05-23 @ 12:15)  HR: 65 (04-05-23 @ 14:45) (58 - 70)  BP: 132/70 (04-05-23 @ 14:45) (107/72 - 138/81)  RR: 18 (04-05-23 @ 14:45) (14 - 18)  SpO2: 98% (04-05-23 @ 13:15) (97% - 100%)    No apparent anesthesia complications. Vital signs stable, non-labored breathing with adequate oxygen saturation. Pain and nausea are controlled. All questions answered. Stable for PACU discharge and transfer to the floor.
no anesthesia complications

## 2023-04-07 NOTE — PROGRESS NOTE ADULT - SUBJECTIVE AND OBJECTIVE BOX
PATIENT: TETE NICHOLAS, MRN: [id number]    CHIEF COMPLAINT: Patient is a 67y old  Male who presents with a chief complaint of Melena (06 Apr 2023 10:16)      INTERVAL HISTORY/OVERNIGHT EVENTS: No overnight events.     MEDICATIONS:  MEDICATIONS  (STANDING):  dextrose 5%. 1000 milliLiter(s) (50 mL/Hr) IV Continuous <Continuous>  dextrose 5%. 1000 milliLiter(s) (100 mL/Hr) IV Continuous <Continuous>  dextrose 50% Injectable 25 Gram(s) IV Push once  dextrose 50% Injectable 12.5 Gram(s) IV Push once  dextrose 50% Injectable 25 Gram(s) IV Push once  glucagon  Injectable 1 milliGRAM(s) IntraMuscular once  insulin lispro (ADMELOG) corrective regimen sliding scale   SubCutaneous three times a day before meals  insulin lispro (ADMELOG) corrective regimen sliding scale   SubCutaneous at bedtime  pantoprazole  Injectable 40 milliGRAM(s) IV Push every 12 hours    MEDICATIONS  (PRN):  aluminum hydroxide/magnesium hydroxide/simethicone Suspension 30 milliLiter(s) Oral every 4 hours PRN Dyspepsia  dextrose Oral Gel 15 Gram(s) Oral once PRN Blood Glucose LESS THAN 70 milliGRAM(s)/deciliter  melatonin 3 milliGRAM(s) Oral at bedtime PRN Insomnia  ondansetron Injectable 4 milliGRAM(s) IV Push every 8 hours PRN Nausea and/or Vomiting      ALLERGIES: Allergies    No Known Allergies    Intolerances        OBJECTIVE:  ICU Vital Signs Last 24 Hrs  T(C): 36.3 (07 Apr 2023 05:46), Max: 36.7 (06 Apr 2023 21:29)  T(F): 97.4 (07 Apr 2023 05:46), Max: 98.1 (06 Apr 2023 21:29)  HR: 72 (07 Apr 2023 05:46) (72 - 88)  BP: 125/63 (07 Apr 2023 05:46) (106/70 - 139/77)  BP(mean): --  ABP: --  ABP(mean): --  RR: 17 (07 Apr 2023 05:46) (17 - 17)  SpO2: 99% (07 Apr 2023 05:46) (98% - 99%)    O2 Parameters below as of 07 Apr 2023 05:46  Patient On (Oxygen Delivery Method): room air            Adult Advanced Hemodynamics Last 24 Hrs  CVP(mm Hg): --  CVP(cm H2O): --  CO: --  CI: --  PA: --  PA(mean): --  PCWP: --  SVR: --  SVRI: --  PVR: --  PVRI: --  CAPILLARY BLOOD GLUCOSE      POCT Blood Glucose.: 168 mg/dL (06 Apr 2023 21:37)  POCT Blood Glucose.: 199 mg/dL (06 Apr 2023 17:45)  POCT Blood Glucose.: 185 mg/dL (06 Apr 2023 12:21)  POCT Blood Glucose.: 142 mg/dL (06 Apr 2023 08:33)    CAPILLARY BLOOD GLUCOSE      POCT Blood Glucose.: 168 mg/dL (06 Apr 2023 21:37)    I&O's Summary    Daily     Daily     PHYSICAL EXAMINATION:  GENERAL: NAD, lying comfortably in bed   HEAD:  Atraumatic, Normocephalic  EYES: EOMI b/l, PERRLA b/l, conjunctiva and sclera clear  NECK: Supple, No JVD, No LAD   CHEST/LUNG: Clear to auscultation bilaterally; No wheeze or rhonchi  HEART: Regular rate and rhythm; S1 and S2 present, No murmurs, rubs, or gallops  ABDOMEN: Soft, Nontender, Nondistended; Bowel sounds present  EXTREMITIES:  2+ Peripheral Pulses, No clubbing, cyanosis, or edema  NEURO: AAOx3, non-focal   SKIN: No rashes or lesions    LABS:                          7.8    7.64  )-----------( 169      ( 06 Apr 2023 12:08 )             24.4     04-06    138  |  107  |  21  ----------------------------<  173<H>  4.1   |  23  |  0.94    Ca    8.4      06 Apr 2023 02:40  Phos  2.3     04-06  Mg     2.10     04-06    TPro  5.6<L>  /  Alb  3.4  /  TBili  0.4  /  DBili  x   /  AST  18  /  ALT  24  /  AlkPhos  45  04-06    LIVER FUNCTIONS - ( 06 Apr 2023 02:40 )  Alb: 3.4 g/dL / Pro: 5.6 g/dL / ALK PHOS: 45 U/L / ALT: 24 U/L / AST: 18 U/L / GGT: x                PATIENT: TETE NICHOLAS, MRN: [id number]    CHIEF COMPLAINT: Patient is a 67y old  Male who presents with a chief complaint of Melena (06 Apr 2023 10:16)      INTERVAL HISTORY/OVERNIGHT EVENTS: Patient Hgb low this morning, trending down. GI notified and will prepare for possible repeat EGD as patient is still having melena. He is HDS and without acute complaints at bedside. Denies CP, SOB, lightheadedness.     MEDICATIONS:  MEDICATIONS  (STANDING):  dextrose 5%. 1000 milliLiter(s) (50 mL/Hr) IV Continuous <Continuous>  dextrose 5%. 1000 milliLiter(s) (100 mL/Hr) IV Continuous <Continuous>  dextrose 50% Injectable 25 Gram(s) IV Push once  dextrose 50% Injectable 12.5 Gram(s) IV Push once  dextrose 50% Injectable 25 Gram(s) IV Push once  glucagon  Injectable 1 milliGRAM(s) IntraMuscular once  insulin lispro (ADMELOG) corrective regimen sliding scale   SubCutaneous three times a day before meals  insulin lispro (ADMELOG) corrective regimen sliding scale   SubCutaneous at bedtime  pantoprazole  Injectable 40 milliGRAM(s) IV Push every 12 hours    MEDICATIONS  (PRN):  aluminum hydroxide/magnesium hydroxide/simethicone Suspension 30 milliLiter(s) Oral every 4 hours PRN Dyspepsia  dextrose Oral Gel 15 Gram(s) Oral once PRN Blood Glucose LESS THAN 70 milliGRAM(s)/deciliter  melatonin 3 milliGRAM(s) Oral at bedtime PRN Insomnia  ondansetron Injectable 4 milliGRAM(s) IV Push every 8 hours PRN Nausea and/or Vomiting      ALLERGIES: Allergies    No Known Allergies    Intolerances        OBJECTIVE:  ICU Vital Signs Last 24 Hrs  T(C): 36.3 (07 Apr 2023 05:46), Max: 36.7 (06 Apr 2023 21:29)  T(F): 97.4 (07 Apr 2023 05:46), Max: 98.1 (06 Apr 2023 21:29)  HR: 72 (07 Apr 2023 05:46) (72 - 88)  BP: 125/63 (07 Apr 2023 05:46) (106/70 - 139/77)  BP(mean): --  ABP: --  ABP(mean): --  RR: 17 (07 Apr 2023 05:46) (17 - 17)  SpO2: 99% (07 Apr 2023 05:46) (98% - 99%)    O2 Parameters below as of 07 Apr 2023 05:46  Patient On (Oxygen Delivery Method): room air            Adult Advanced Hemodynamics Last 24 Hrs  CVP(mm Hg): --  CVP(cm H2O): --  CO: --  CI: --  PA: --  PA(mean): --  PCWP: --  SVR: --  SVRI: --  PVR: --  PVRI: --  CAPILLARY BLOOD GLUCOSE      POCT Blood Glucose.: 168 mg/dL (06 Apr 2023 21:37)  POCT Blood Glucose.: 199 mg/dL (06 Apr 2023 17:45)  POCT Blood Glucose.: 185 mg/dL (06 Apr 2023 12:21)  POCT Blood Glucose.: 142 mg/dL (06 Apr 2023 08:33)    CAPILLARY BLOOD GLUCOSE      POCT Blood Glucose.: 168 mg/dL (06 Apr 2023 21:37)    I&O's Summary    Daily     Daily     PHYSICAL EXAMINATION:  GENERAL: NAD, lying comfortably in bed   HEAD:  Atraumatic, Normocephalic  EYES: EOMI b/l, PERRLA b/l, conjunctiva and sclera clear  NECK: Supple, No JVD, No LAD   CHEST/LUNG: Clear to auscultation bilaterally; No wheeze or rhonchi  HEART: Regular rate and rhythm; S1 and S2 present, No murmurs, rubs, or gallops  ABDOMEN: Soft, Nontender, Nondistended; Bowel sounds present  EXTREMITIES:  2+ Peripheral Pulses, No clubbing, cyanosis, or edema  NEURO: AAOx3, non-focal   SKIN: No rashes or lesions    LABS:                          7.8    7.64  )-----------( 169      ( 06 Apr 2023 12:08 )             24.4     04-06    138  |  107  |  21  ----------------------------<  173<H>  4.1   |  23  |  0.94    Ca    8.4      06 Apr 2023 02:40  Phos  2.3     04-06  Mg     2.10     04-06    TPro  5.6<L>  /  Alb  3.4  /  TBili  0.4  /  DBili  x   /  AST  18  /  ALT  24  /  AlkPhos  45  04-06    LIVER FUNCTIONS - ( 06 Apr 2023 02:40 )  Alb: 3.4 g/dL / Pro: 5.6 g/dL / ALK PHOS: 45 U/L / ALT: 24 U/L / AST: 18 U/L / GGT: x

## 2023-04-07 NOTE — PROGRESS NOTE ADULT - PROBLEM SELECTOR PLAN 3
last PCI 2021; on DAPT  - Cards consulted; ok to hold plavix  - no contraindication to restart ASA after procedure

## 2023-04-07 NOTE — PROGRESS NOTE ADULT - ASSESSMENT
67-year-old male past medical history HTN HLD DM CAD status post stents on aspirin/Plavix presents with 5 days of dark black stool associated with epigastric pain described as burning.  No similar history of dark stool.  Has never seen a GI doctor or EGD.  Last colonoscopy over 7 years ago.  No nausea vomiting.  No chest pain shortness of breath fevers chills. Hb found 9.6 on admission now down to 8.7. GI called for further evaluation.       #Gastric ulcer  - Non-bleeding gastric ulcer with a visible vessel s/p Clip  on 4/5  -now with drop of Hb and dark stool, pending repeat CBC                  #CAD s/p PCI on DAPT  #HTN  #HLD    Recommendations:  -Continue PPI BID  -Repeat CBC stat  -If still around 7, would give 1 unit of blood   -NPO, tentatively plan for EGD around 4:30 pending repeat CBC    Recommendations preliminary until signed by attending.     Garo Huston MD  Gastroenterology/Hepatology Fellow  1st option: [phone] (text or call), ONLY available from 7:00 am to 5:00 pm.   **Contact on-call GI fellow via answering service ([phone]) from 5pm-7am AND on weekends/holidays**  2nd option: Available via [employer] Teams  3rd option: Pager: [phone]

## 2023-04-07 NOTE — DISCHARGE NOTE PROVIDER - NSDCMRMEDTOKEN_GEN_ALL_CORE_FT
amLODIPine 5 mg oral tablet: 1 tab(s) orally once a day  aspirin 81 mg oral delayed release tablet: 1 tab(s) orally once a day  atorvastatin 80 mg oral tablet: 1 orally once a day (at bedtime)  clopidogrel 75 mg oral tablet: 1 tab(s) orally once a day  famotidine 20 mg oral tablet: 1 tab(s) orally 2 times a day  folic acid 1 mg oral tablet: 1 tab(s) orally once a day  glipiZIDE 2.5 mg oral tablet, extended release: 1 orally 2 times a day  losartan 100 mg oral tablet: 1 tab(s) orally once a day  metoprolol succinate 25 mg oral tablet, extended release: 1 tab(s) orally 2 times a day   tamsulosin 0.4 mg oral capsule: 1 cap(s) orally once a day   aspirin 81 mg oral delayed release tablet: 1 tab(s) orally once a day  atorvastatin 80 mg oral tablet: 1 orally once a day (at bedtime)  bismuth subsalicylate 262 mg/15 mL oral suspension: 30 milliliter(s) orally 4 times a day Please take 4 times a day until 4/21  folic acid 1 mg oral tablet: 1 tab(s) orally once a day  glipiZIDE 2.5 mg oral tablet, extended release: 1 orally 2 times a day  metroNIDAZOLE 250 mg oral tablet: 1 tab(s) orally 4 times a day Please take 4 times a day until 4/21  pantoprazole 40 mg oral delayed release tablet: 1 tab(s) orally 2 times a day Please take twice a day for 6 weeks, then once a day  tetracycline 500 mg oral capsule: 1 cap(s) orally 4 times a day Please take 4 times a day until 4/21   aspirin 81 mg oral delayed release tablet: 1 tab(s) orally once a day  atorvastatin 80 mg oral tablet: 1 orally once a day (at bedtime)  bismuth subsalicylate 262 mg/15 mL oral suspension: 30 milliliter(s) orally 4 times a day Please take 4 times a day until 4/21  Flomax 0.4 mg oral capsule: 1 tab(s) orally once a day (at bedtime)  folic acid 1 mg oral tablet: 1 tab(s) orally once a day  glipiZIDE 2.5 mg oral tablet, extended release: 1 orally 2 times a day  metroNIDAZOLE 250 mg oral tablet: 1 tab(s) orally 4 times a day Please take 4 times a day until 4/21  pantoprazole 40 mg oral delayed release tablet: 1 tab(s) orally 2 times a day Please take twice a day for 6 weeks, then once a day  pantoprazole 40 mg oral delayed release tablet: 1 tab(s) orally 2 times a day Please take twice a day for 6 weeks, then once a day  tetracycline 500 mg oral capsule: 1 cap(s) orally 4 times a day Please take 4 times a day until 4/21

## 2023-04-07 NOTE — PROVIDER CONTACT NOTE (CRITICAL VALUE NOTIFICATION) - ACTION/TREATMENT ORDERED:
MD made aware. no interventions noted at this time no abrasions, no jaundice, no lesions, no pruritis, and no rashes.

## 2023-04-07 NOTE — DISCHARGE NOTE PROVIDER - NSDCCPCAREPLAN_GEN_ALL_CORE_FT
PRINCIPAL DISCHARGE DIAGNOSIS  Diagnosis: Melena  Assessment and Plan of Treatment: You were found to have dark stool and low blood counts. You were started on medication to decrease the amount of acid in the stomach, pantoprazole. Your endoscopy showed multiple non bleeding ulcers and one vessel was clipped to prevent further bleeds during the procedure. You tested positive for a bacteria in the gut that can cause gastric ulcers.   Please continue to take pantoprazole   Please follow up with GI doctor when you leave the hospital      SECONDARY DISCHARGE DIAGNOSES  Diagnosis: CAD (coronary artery disease)  Assessment and Plan of Treatment: You were on 2 antiplatelet medications - aspirin and plavix for your heart disease. Cardiology was consulted on the case and agreed with holding your plavix given the GI bleeding. Please follow up with your cardiologist outpatient to further evaluate.   Please continue to take your daily aspirin.    Diagnosis: Diabetes mellitus  Assessment and Plan of Treatment: Your A1C was 6.8 in the hospital. Please follow up with your primary care doctor to further evaluate for your diabetes. Please limit your carbohydrate intake.    Diagnosis: Fall  Assessment and Plan of Treatment: You had a fall in the hospital. You were seen by physical therapy who recommended.... This was likely from dehydration vs anesthesia effect after your procedure. Please continue to stay hydrated at home and follow up with your primary care doctor.     PRINCIPAL DISCHARGE DIAGNOSIS  Diagnosis: Melena  Assessment and Plan of Treatment: You were found to have dark stool and low blood counts. You were started on medication to decrease the amount of acid in the stomach, pantoprazole. Your endoscopy showed multiple non bleeding ulcers and one vessel was clipped to prevent further bleeds during the procedure.   You tested positive for a bacteria (H pylori) in the gut that can cause gastric ulcers. You will need to take medications for 2 weeks to prevent further growth. Please follow up with your primary care doctor for repeat stool testing to ensure elimination of H. pylori.   Please continue to take pantoprazole and other medications as prescribed.  Please follow up with GI doctor when you leave the hospital      SECONDARY DISCHARGE DIAGNOSES  Diagnosis: Diabetes mellitus  Assessment and Plan of Treatment: Your A1C was 6.8 in the hospital. Please follow up with your primary care doctor to further evaluate for your diabetes. Please limit your carbohydrate intake.    Diagnosis: Fall  Assessment and Plan of Treatment: You had a fall in the hospital. You were seen by physical therapy who recommended.... This was likely from dehydration vs anesthesia effect after your procedure. Please continue to stay hydrated at home and follow up with your primary care doctor.    Diagnosis: CAD (coronary artery disease)  Assessment and Plan of Treatment: You were on 2 antiplatelet medications - aspirin and plavix for your heart disease. Cardiology was consulted on the case and agreed with holding your plavix given the GI bleeding. Please follow up with your cardiologist outpatient to further evaluate.   Please continue to take your daily aspirin.    Diagnosis: Hypertension  Assessment and Plan of Treatment: You have high blood pressure and take medications at home. These medications were held because of your GI bleeding. Please follow up with your primary care doctor to restart these medications.     PRINCIPAL DISCHARGE DIAGNOSIS  Diagnosis: Melena  Assessment and Plan of Treatment: You were found to have dark stool and low blood counts. You were started on medication to decrease the amount of acid in the stomach, pantoprazole. Your endoscopy showed multiple non bleeding ulcers and one vessel was clipped to prevent further bleeds during the procedure.   You tested positive for a bacteria (H pylori) in the gut that can cause gastric ulcers. You will need to take medications for 2 weeks to prevent further growth. Please follow up with your primary care doctor for repeat stool testing to ensure elimination of H. pylori.   Please continue to take pantoprazole and other medications as prescribed.  Please follow up with GI doctor when you leave the hospital      SECONDARY DISCHARGE DIAGNOSES  Diagnosis: Diabetes mellitus  Assessment and Plan of Treatment: Your A1C was 6.8 in the hospital. Please follow up with your primary care doctor to further evaluate for your diabetes. Please limit your carbohydrate intake.    Diagnosis: Fall  Assessment and Plan of Treatment: You had a fall in the hospital. You were seen by physical therapy who recommended physical therapy at home. This was likely from dehydration vs anesthesia effect after your procedure. Please continue to stay hydrated at home and follow up with your primary care doctor.    Diagnosis: CAD (coronary artery disease)  Assessment and Plan of Treatment: You were on 2 antiplatelet medications - aspirin and plavix for your heart disease. Cardiology was consulted on the case and agreed with holding your plavix given the GI bleeding. Please follow up with your cardiologist outpatient to further evaluate.   Please continue to take your daily aspirin.    Diagnosis: Hypertension  Assessment and Plan of Treatment: You have high blood pressure and take medications at home. These medications were held because of your GI bleeding. Please follow up with your primary care doctor to restart these medications.

## 2023-04-07 NOTE — PROGRESS NOTE ADULT - ATTENDING COMMENTS
66M HTN, DM2, HLD, CAD s/p PCI (2021) on DAPT who presents with 5 days of black stools concerning for UGIB.   pt reports 2 dark BM last night, none this morning    # UGIB- s/p EGD on 4/5 which showed ulcer with non-bleeding vessel that was clipped. H. pylori studies pending and can be followed up as outpatient. c/w PPI BID x 8 weeks. Monitor CBC q6h  Hgb downtrending from 8-->7.8-->7 today, transfuse prn Hgb<7  Venofer 200mg x1  C/w iv protonix 40 bid, if continues to have melena, change to protonix gtt, per GI tentatively plan for repeat EGD around 4:30 pending repeat CBC    # Fall - occurred immediately after returning from endoscopy. CTH and XR shoulder negative. Will obtain PT consult.

## 2023-04-07 NOTE — DISCHARGE NOTE PROVIDER - NSDCFUADDAPPT_GEN_ALL_CORE_FT
Please follow up with your primary care doctor for repeat stool testing for H pylori, please continue to take your medications as prescribed.   Please also schedule an appointment with the GI clinic for follow up.   Please do not resume your blood pressure medications until follow up with your primary care doctor. Please STOP plavix and only take aspirin for your heart disease.

## 2023-04-07 NOTE — PROGRESS NOTE ADULT - ASSESSMENT
67yo M w/ pmhx HTN, DM, HLD, CAD s/p stents (2021) on DAPT presented w/ 5 day hx of daily black stools admitted for further work up of GIB. EGD with multiple non-bleeding ulcers, once vessel clipped and Hgb stable. RRT called 4/5 for fall i/s/o lightheadedness, likely from dehydration s/p 1L IVF.

## 2023-04-07 NOTE — DISCHARGE NOTE PROVIDER - HOSPITAL COURSE
65yo M w/ pmhx HTN, DM, HLD, CAD s/p stents (2021) on DAPT presented w/ 5 day hx of daily black stools admitted for endoscopy found to have non-bleeding gastric ulcers.     On arrival, patient HDS, Hgb dropped 1 point and GI consulted for EGD. Pt was started on PPI BID, 2 large bore IVs in place. EGD showed multiple non-bleeding gastric ulcers and one vessel was clipped. H pylori serology positive, stool testing pending. Antibiotics.... Course c/b RRT on 4/5 for fall, pt lightheadedness and fell to his side, endorsed slight headache. CTH non-con with no acute abnormalities, Xray elbow negative for fracture. PT recommended ...    Patient deemed medically stable on the day of discharge for ... 65yo M w/ pmhx HTN, DM, HLD, CAD s/p stents (2021) on DAPT presented w/ 5 day hx of daily black stools admitted for endoscopy found to have non-bleeding gastric ulcers.     On arrival, patient HDS, Hgb dropped 1 point and GI consulted for EGD. Pt was started on PPI BID, 2 large bore IVs in place. EGD showed multiple non-bleeding gastric ulcers and one vessel was clipped. H pylori serology positive, stool testing pending. Course c/b RRT on 4/5 for fall, pt lightheadedness and fell to his side, endorsed slight headache. CTH non-con with no acute abnormalities, Xray elbow negative for fracture. PT recommended ...    Patient deemed medically stable on the day of discharge for ... 67yo M w/ pmhx HTN, DM, HLD, CAD s/p stents (2021) on DAPT presented w/ 5 day hx of daily black stools admitted for endoscopy found to have non-bleeding gastric ulcers.     On arrival, patient HDS, Hgb dropped 1 point and GI consulted for EGD. Pt was started on PPI BID, 2 large bore IVs in place. EGD showed multiple non-bleeding gastric ulcers and one vessel was clipped. H pylori serology and stool antigen positive. Pt started on bismuth quadruple therapy. Course c/b RRT on 4/5 for fall, pt lightheadedness and fell to his side, endorsed slight headache. CTH non-con with no acute abnormalities, Xray elbow negative for fracture. Pt with additional melena and blood loss anemia, taken for repeat EGD 4/7 which showed additional non-bleeding ulcer, clip placed.     Seen by PT, recommended home PT. Patient deemed medically stable on the day of discharge.     Major changes:  1. Continue with bismuth quadruple therapy for H. pylori, will need repeat stool testing outpatient   2. STOP plavix, continue with aspirin   3. Follow up with Dr. Pinon to resume blood pressure medications 67yo M w/ pmhx HTN, DM, HLD, CAD s/p stents (2021) on DAPT presented w/ 5 day hx of daily black stools admitted for endoscopy found to have non-bleeding gastric ulcers.     On arrival, patient HDS, Hgb dropped 1 point and GI consulted for EGD. Pt was started on PPI BID, 2 large bore IVs in place. Anti-hypertensives held. EGD showed multiple non-bleeding gastric ulcers and one vessel was clipped. H pylori serology and stool antigen positive. Pt started on bismuth quadruple therapy. Course c/b RRT on 4/5 for fall, pt lightheadedness and fell to his side, endorsed slight headache. CTH non-con with no acute abnormalities, Xray elbow negative for fracture. Pt with additional melena and blood loss anemia, taken for repeat EGD 4/7 which showed additional non-bleeding ulcer, clip placed.     Seen by PT, recommended home PT. Patient deemed medically stable on the day of discharge.     Major changes:  1. Continue with bismuth quadruple therapy for H. pylori, will need repeat stool testing outpatient   2. STOP plavix, continue with aspirin   3. Follow up with Dr. Pinon to resume blood pressure medications

## 2023-04-07 NOTE — DISCHARGE NOTE PROVIDER - NSDCCPTREATMENT_GEN_ALL_CORE_FT
PRINCIPAL PROCEDURE  Procedure: UGI endoscopy  Findings and Treatment:   < end of copied text >  Findings:       The examined esophagus was normal.       One non-bleeding cratered gastric ulcer with previously placed        hemostatic clip with a nonbleeding visible vessel (Catrachito Class IIa)        was found in the gastric antrum. The lesion was 3 mm in largest        dimension. For hemostasis, one hemostatic clip was successfully placed.        There was no bleeding at the end of the procedure.       The exam of the stomach was otherwise normal.       The first portion of the duodenum, second portion of the duodenum and        third portion of the duodenum were normal.                                                  Impression:          - Normal esophagus.                       - Non-bleeding gastric ulcer with a nonbleeding visible                        vessel (Catrachito Class IIa). Clip was placed.        - Normal first portion of the duodenum, second portion                        of the duodenum and third portion of the duodenum.                       - No specimens collected.< from: Upper Endoscopy (04.07.23 @ 15:21) >

## 2023-04-07 NOTE — PROGRESS NOTE ADULT - SUBJECTIVE AND OBJECTIVE BOX
Interval Events:   having dark stool   hb drop    Hospital Medications:  aluminum hydroxide/magnesium hydroxide/simethicone Suspension 30 milliLiter(s) Oral every 4 hours PRN  dextrose 5%. 1000 milliLiter(s) IV Continuous <Continuous>  dextrose 5%. 1000 milliLiter(s) IV Continuous <Continuous>  dextrose 50% Injectable 25 Gram(s) IV Push once  dextrose 50% Injectable 12.5 Gram(s) IV Push once  dextrose 50% Injectable 25 Gram(s) IV Push once  dextrose Oral Gel 15 Gram(s) Oral once PRN  glucagon  Injectable 1 milliGRAM(s) IntraMuscular once  insulin lispro (ADMELOG) corrective regimen sliding scale   SubCutaneous three times a day before meals  insulin lispro (ADMELOG) corrective regimen sliding scale   SubCutaneous at bedtime  iron sucrose Injectable 200 milliGRAM(s) IV Push once  melatonin 3 milliGRAM(s) Oral at bedtime PRN  ondansetron Injectable 4 milliGRAM(s) IV Push every 8 hours PRN  pantoprazole  Injectable 40 milliGRAM(s) IV Push every 12 hours      ROS: All system reviewed and negative except as mentioned above.    PHYSICAL EXAM:   Vital Signs:  Vital Signs Last 24 Hrs  T(C): 36.3 (07 Apr 2023 05:46), Max: 36.7 (06 Apr 2023 21:29)  T(F): 97.4 (07 Apr 2023 05:46), Max: 98.1 (06 Apr 2023 21:29)  HR: 72 (07 Apr 2023 05:46) (72 - 88)  BP: 125/63 (07 Apr 2023 05:46) (106/70 - 139/77)  BP(mean): --  RR: 17 (07 Apr 2023 05:46) (17 - 17)  SpO2: 99% (07 Apr 2023 05:46) (98% - 99%)    Parameters below as of 07 Apr 2023 05:46  Patient On (Oxygen Delivery Method): room air      Daily     Daily     GENERAL:  NAD, Appears stated age  HEENT:  NC/AT,  conjunctivae clear and pink, sclera -anicteric  CHEST:  Normal Effort, Breath sounds clear  HEART:  RRR, S1 + S2, no murmurs  ABDOMEN:  Soft, non-tender, non-distended, normoactive bowel sounds,  no masses  EXTREMITIES:  no cyanosis or edema  SKIN:  Warm & Dry. No rash or erythema  NEURO:  Alert, oriented, no focal deficit    LABS:                        7.0    7.35  )-----------( 157      ( 07 Apr 2023 07:10 )             20.9     Mean Cell Volume: 91.3 fL (04-07-23 @ 07:10)    04-07    138  |  106  |  22  ----------------------------<  199<H>  3.6   |  24  |  0.90    Ca    8.3<L>      07 Apr 2023 07:10  Phos  2.2     04-07  Mg     2.00     04-07    TPro  5.2<L>  /  Alb  3.4  /  TBili  0.2  /  DBili  x   /  AST  13  /  ALT  17  /  AlkPhos  44  04-07    LIVER FUNCTIONS - ( 07 Apr 2023 07:10 )  Alb: 3.4 g/dL / Pro: 5.2 g/dL / ALK PHOS: 44 U/L / ALT: 17 U/L / AST: 13 U/L / GGT: x                                       7.0    7.35  )-----------( 157      ( 07 Apr 2023 07:10 )             20.9                         7.8    7.64  )-----------( 169      ( 06 Apr 2023 12:08 )             24.4                         8.0    5.97  )-----------( 157      ( 06 Apr 2023 02:40 )             24.6                         9.2    6.62  )-----------( 158      ( 05 Apr 2023 16:43 )             27.8                         8.7    4.92  )-----------( 159      ( 05 Apr 2023 05:30 )             27.1       Imaging: Images reviewed.

## 2023-04-08 LAB
ANION GAP SERPL CALC-SCNC: 11 MMOL/L — SIGNIFICANT CHANGE UP (ref 7–14)
BUN SERPL-MCNC: 11 MG/DL — SIGNIFICANT CHANGE UP (ref 7–23)
CALCIUM SERPL-MCNC: 8.8 MG/DL — SIGNIFICANT CHANGE UP (ref 8.4–10.5)
CHLORIDE SERPL-SCNC: 104 MMOL/L — SIGNIFICANT CHANGE UP (ref 98–107)
CO2 SERPL-SCNC: 24 MMOL/L — SIGNIFICANT CHANGE UP (ref 22–31)
CREAT SERPL-MCNC: 0.97 MG/DL — SIGNIFICANT CHANGE UP (ref 0.5–1.3)
EGFR: 85 ML/MIN/1.73M2 — SIGNIFICANT CHANGE UP
GLUCOSE SERPL-MCNC: 130 MG/DL — HIGH (ref 70–99)
HCT VFR BLD CALC: 26.2 % — LOW (ref 39–50)
HCT VFR BLD CALC: 28.6 % — LOW (ref 39–50)
HGB BLD-MCNC: 8.8 G/DL — LOW (ref 13–17)
HGB BLD-MCNC: 9.6 G/DL — LOW (ref 13–17)
MAGNESIUM SERPL-MCNC: 1.9 MG/DL — SIGNIFICANT CHANGE UP (ref 1.6–2.6)
MCHC RBC-ENTMCNC: 28.8 PG — SIGNIFICANT CHANGE UP (ref 27–34)
MCHC RBC-ENTMCNC: 29 PG — SIGNIFICANT CHANGE UP (ref 27–34)
MCHC RBC-ENTMCNC: 33.6 GM/DL — SIGNIFICANT CHANGE UP (ref 32–36)
MCHC RBC-ENTMCNC: 33.6 GM/DL — SIGNIFICANT CHANGE UP (ref 32–36)
MCV RBC AUTO: 85.6 FL — SIGNIFICANT CHANGE UP (ref 80–100)
MCV RBC AUTO: 86.4 FL — SIGNIFICANT CHANGE UP (ref 80–100)
NRBC # BLD: 0 /100 WBCS — SIGNIFICANT CHANGE UP (ref 0–0)
NRBC # BLD: 0 /100 WBCS — SIGNIFICANT CHANGE UP (ref 0–0)
NRBC # FLD: 0.02 K/UL — HIGH (ref 0–0)
NRBC # FLD: 0.03 K/UL — HIGH (ref 0–0)
PHOSPHATE SERPL-MCNC: 2.7 MG/DL — SIGNIFICANT CHANGE UP (ref 2.5–4.5)
PLATELET # BLD AUTO: 154 K/UL — SIGNIFICANT CHANGE UP (ref 150–400)
PLATELET # BLD AUTO: 160 K/UL — SIGNIFICANT CHANGE UP (ref 150–400)
POTASSIUM SERPL-MCNC: 3.6 MMOL/L — SIGNIFICANT CHANGE UP (ref 3.5–5.3)
POTASSIUM SERPL-SCNC: 3.6 MMOL/L — SIGNIFICANT CHANGE UP (ref 3.5–5.3)
RBC # BLD: 3.06 M/UL — LOW (ref 4.2–5.8)
RBC # BLD: 3.31 M/UL — LOW (ref 4.2–5.8)
RBC # FLD: 15.8 % — HIGH (ref 10.3–14.5)
RBC # FLD: 15.9 % — HIGH (ref 10.3–14.5)
SODIUM SERPL-SCNC: 139 MMOL/L — SIGNIFICANT CHANGE UP (ref 135–145)
WBC # BLD: 7.23 K/UL — SIGNIFICANT CHANGE UP (ref 3.8–10.5)
WBC # BLD: 9.21 K/UL — SIGNIFICANT CHANGE UP (ref 3.8–10.5)
WBC # FLD AUTO: 7.23 K/UL — SIGNIFICANT CHANGE UP (ref 3.8–10.5)
WBC # FLD AUTO: 9.21 K/UL — SIGNIFICANT CHANGE UP (ref 3.8–10.5)

## 2023-04-08 PROCEDURE — 99232 SBSQ HOSP IP/OBS MODERATE 35: CPT | Mod: GC

## 2023-04-08 RX ORDER — ASPIRIN/CALCIUM CARB/MAGNESIUM 324 MG
81 TABLET ORAL DAILY
Refills: 0 | Status: DISCONTINUED | OUTPATIENT
Start: 2023-04-08 | End: 2023-04-09

## 2023-04-08 RX ORDER — PANTOPRAZOLE SODIUM 20 MG/1
40 TABLET, DELAYED RELEASE ORAL EVERY 12 HOURS
Refills: 0 | Status: DISCONTINUED | OUTPATIENT
Start: 2023-04-08 | End: 2023-04-09

## 2023-04-08 RX ADMIN — Medication 500 MILLIGRAM(S): at 13:33

## 2023-04-08 RX ADMIN — Medication 1: at 08:57

## 2023-04-08 RX ADMIN — Medication 250 MILLIGRAM(S): at 05:29

## 2023-04-08 RX ADMIN — Medication 1: at 17:56

## 2023-04-08 RX ADMIN — PANTOPRAZOLE SODIUM 10 MG/HR: 20 TABLET, DELAYED RELEASE ORAL at 05:30

## 2023-04-08 RX ADMIN — Medication 30 MILLILITER(S): at 13:33

## 2023-04-08 RX ADMIN — Medication 500 MILLIGRAM(S): at 05:29

## 2023-04-08 RX ADMIN — Medication 250 MILLIGRAM(S): at 13:32

## 2023-04-08 RX ADMIN — Medication 250 MILLIGRAM(S): at 23:35

## 2023-04-08 RX ADMIN — Medication 81 MILLIGRAM(S): at 13:33

## 2023-04-08 RX ADMIN — Medication 30 MILLILITER(S): at 17:58

## 2023-04-08 RX ADMIN — Medication 30 MILLILITER(S): at 23:35

## 2023-04-08 RX ADMIN — PANTOPRAZOLE SODIUM 40 MILLIGRAM(S): 20 TABLET, DELAYED RELEASE ORAL at 17:58

## 2023-04-08 RX ADMIN — Medication 30 MILLILITER(S): at 05:29

## 2023-04-08 RX ADMIN — Medication 500 MILLIGRAM(S): at 17:56

## 2023-04-08 RX ADMIN — Medication 250 MILLIGRAM(S): at 17:57

## 2023-04-08 RX ADMIN — Medication 500 MILLIGRAM(S): at 23:35

## 2023-04-08 NOTE — PHYSICAL THERAPY INITIAL EVALUATION ADULT - PHYSICAL ASSIST/NONPHYSICAL ASSIST: SUPINE/SIT, REHAB EVAL
----- Message from Von Woodard DO sent at 6/22/2022 10:45 AM EDT -----  Let patient know mammogram was normal
I sp[joseluis to pt she is aware of her results
verbal cues/nonverbal cues (demo/gestures)/1 person assist

## 2023-04-08 NOTE — PHYSICAL THERAPY INITIAL EVALUATION ADULT - PERTINENT HX OF CURRENT PROBLEM, REHAB EVAL
66 year old male with PMHx HTN, DM, HLD, CAD s/p stents (2021) on DAPT presented with 5 day hx of daily black stools. He also reported mild burning epigastric pain that self-resolved. Additionally he reported noticeable weight loss for the past few weeks; no changes in eating habits. He endorsed nausea but denied vomit or diarrhea. ED course: Hgb dropped 1 point and GI consulted for EGD, found to have non-bleeding ulcer.

## 2023-04-08 NOTE — PROVIDER CONTACT NOTE (OTHER) - ASSESSMENT
patient's /57
pt is alert and oriented. not in any distress
patient returned to 9N after endoscopy and s/p 2 units of blood. Patient reported numbness of the lower back. patient denied SOB, chest pain, chills. no acute distress noted. /70.

## 2023-04-08 NOTE — PROGRESS NOTE ADULT - ATTENDING COMMENTS
# UGIB/melena due to  s/p clipping (EGD x 2 on 4/5 and 4/7)  # CAD on DAPT    --Continue high dose PPI as noted above; should remain on at least daily PPI lifelong if anti-platelet therapy continued  --OK to resume ASA 81mg; if Hgb remains stable, OK to resume Plavix tomorrow  --Continue to trend Hgb and monitor for recurrent bleeding    Additional recommendations as above

## 2023-04-08 NOTE — PROGRESS NOTE ADULT - ASSESSMENT
67-year-old male past medical history HTN HLD DM CAD status post stents on aspirin/Plavix presents with 5 days of dark black stool associated with epigastric pain described as burning.  No similar history of dark stool.  Has never seen a GI doctor or EGD.  Last colonoscopy over 7 years ago.  No nausea vomiting.  No chest pain shortness of breath fevers chills. Hb found 9.6 on admission now down to 8.7. GI called for further evaluation.     #Gastric ulcer  - Non-bleeding gastric ulcer with a visible vessel s/p Clip on 4/5, EGD repeated 4/7 due to drop in Hg and melena, ulcer still nonbleeding but additional clip placed              #CAD s/p PCI on DAPT  #HTN  #HLD    Recommendations:  -trend vital signs, CBC, and monitor for clinical signs of bleeding  -maintain active type and screen  -transfusion goal to maintain hemoglobin >/= 8.0 and platelets >/= 50  -avoid NSAIDs  -PPI IV BID while inpatient; will need PPI BID for projected 8 week course, in addition to indefinite PPI daily if remains on DAPT  -if remains asymptomatic, ok to advance diet from GI perspective    Note incomplete until finalized by attending signature/attestation.    Gopi Ray  GI/Hepatology Fellow    MONDAY-FRIDAY 8AM-5PM:  Pager# [phone] ([facility]) or [phone] ([facility])    NON-URGENT CONSULTS:  Please email [email or web address] OR [email or web address]  AT NIGHT AND ON WEEKENDS:  Contact on-call GI fellow via answering service ([phone]) from 5pm-8am and on weekends/holidays   67-year-old male past medical history HTN HLD DM CAD status post stents on aspirin/Plavix presents with 5 days of dark black stool associated with epigastric pain described as burning.  No similar history of dark stool.  Has never seen a GI doctor or EGD.  Last colonoscopy over 7 years ago.  No nausea vomiting.  No chest pain shortness of breath fevers chills. Hb found 9.6 on admission now down to 8.7. GI called for further evaluation.     #Gastric ulcer  - Non-bleeding gastric ulcer with a visible vessel s/p Clip on 4/5, EGD repeated 4/7 due to drop in Hg and melena, ulcer still nonbleeding but additional clip placed              #CAD s/p PCI on DAPT  #HTN  #HLD    Recommendations:  -trend vital signs, CBC, and monitor for clinical signs of bleeding  -maintain active type and screen  -transfusion goal to maintain hemoglobin >/= 8.0 and platelets >/= 50  -avoid NSAIDs  -PPI IV BID while inpatient; will need PPI BID for projected 6 week course, in addition to indefinite PPI daily if remains on DAPT  -if remains asymptomatic, ok to advance diet from GI perspective    Note incomplete until finalized by attending signature/attestation.    Gopi Ray  GI/Hepatology Fellow    MONDAY-FRIDAY 8AM-5PM:  Pager# [phone] ([facility]) or [phone] ([facility])    NON-URGENT CONSULTS:  Please email [email or web address] OR [email or web address]  AT NIGHT AND ON WEEKENDS:  Contact on-call GI fellow via answering service ([phone]) from 5pm-8am and on weekends/holidays

## 2023-04-08 NOTE — PROGRESS NOTE ADULT - SUBJECTIVE AND OBJECTIVE BOX
Butch Cook, PGY2    DATE OF SERVICE: 04-08-23 @ 07:42    Patient is a 67y old  Male who presents with a chief complaint of Melena (07 Apr 2023 12:10)      SUBJECTIVE / OVERNIGHT EVENTS: No acute events overnight. Patient underwent EGD 4/7. Patient seen and examined this AM.     MEDICATIONS  (STANDING):  bismuth subsalicylate Liquid 30 milliLiter(s) Oral four times a day  dextrose 5%. 1000 milliLiter(s) (50 mL/Hr) IV Continuous <Continuous>  dextrose 5%. 1000 milliLiter(s) (100 mL/Hr) IV Continuous <Continuous>  dextrose 50% Injectable 25 Gram(s) IV Push once  dextrose 50% Injectable 12.5 Gram(s) IV Push once  dextrose 50% Injectable 25 Gram(s) IV Push once  glucagon  Injectable 1 milliGRAM(s) IntraMuscular once  insulin lispro (ADMELOG) corrective regimen sliding scale   SubCutaneous three times a day before meals  insulin lispro (ADMELOG) corrective regimen sliding scale   SubCutaneous at bedtime  metroNIDAZOLE    Tablet 250 milliGRAM(s) Oral four times a day  pantoprazole Infusion 8 mG/Hr (10 mL/Hr) IV Continuous <Continuous>  tetracycline 500 milliGRAM(s) Oral four times a day    MEDICATIONS  (PRN):  aluminum hydroxide/magnesium hydroxide/simethicone Suspension 30 milliLiter(s) Oral every 4 hours PRN Dyspepsia  dextrose Oral Gel 15 Gram(s) Oral once PRN Blood Glucose LESS THAN 70 milliGRAM(s)/deciliter  melatonin 3 milliGRAM(s) Oral at bedtime PRN Insomnia  ondansetron Injectable 4 milliGRAM(s) IV Push every 8 hours PRN Nausea and/or Vomiting      Vital Signs Last 24 Hrs  T(C): 36.6 (08 Apr 2023 05:25), Max: 36.6 (07 Apr 2023 15:32)  T(F): 97.9 (08 Apr 2023 05:25), Max: 97.9 (07 Apr 2023 15:32)  HR: 62 (08 Apr 2023 05:25) (60 - 91)  BP: 134/83 (08 Apr 2023 05:25) (124/57 - 170/87)  BP(mean): --  RR: 16 (08 Apr 2023 05:25) (14 - 18)  SpO2: 100% (08 Apr 2023 05:25) (97% - 100%)    Parameters below as of 08 Apr 2023 05:25  Patient On (Oxygen Delivery Method): room air      CAPILLARY BLOOD GLUCOSE      POCT Blood Glucose.: 122 mg/dL (07 Apr 2023 21:27)  POCT Blood Glucose.: 132 mg/dL (07 Apr 2023 18:06)  POCT Blood Glucose.: 141 mg/dL (07 Apr 2023 15:40)  POCT Blood Glucose.: 174 mg/dL (07 Apr 2023 12:14)  POCT Blood Glucose.: 166 mg/dL (07 Apr 2023 08:39)    I&O's Summary    07 Apr 2023 07:01  -  08 Apr 2023 07:00  --------------------------------------------------------  IN: 300 mL / OUT: 0 mL / NET: 300 mL        PHYSICAL EXAM:  GENERAL: NAD, lying comfortably in bed   HEAD:  Atraumatic, Normocephalic  EYES: EOMI b/l, PERRLA b/l, conjunctiva and sclera clear  NECK: Supple, No JVD, No LAD   CHEST/LUNG: Clear to auscultation bilaterally; No wheeze or rhonchi  HEART: Regular rate and rhythm; S1 and S2 present, No murmurs, rubs, or gallops  ABDOMEN: Soft, Nontender, Nondistended; Bowel sounds present  EXTREMITIES:  2+ Peripheral Pulses, No clubbing, cyanosis, or edema  NEURO: AAOx3, non-focal   SKIN: No rashes or lesions    LABS:                        9.6    9.21  )-----------( 154      ( 08 Apr 2023 05:50 )             28.6     04-07    138  |  106  |  22  ----------------------------<  199<H>  3.6   |  24  |  0.90    Ca    8.3<L>      07 Apr 2023 07:10  Phos  2.2     04-07  Mg     2.00     04-07    TPro  5.2<L>  /  Alb  3.4  /  TBili  0.2  /  DBili  x   /  AST  13  /  ALT  17  /  AlkPhos  44  04-07              RADIOLOGY & ADDITIONAL TESTS:    Imaging Personally Reviewed:    Consultant(s) Notes Reviewed:      Care Discussed with Consultants/Other Providers:   Butch Cook, PGY2    DATE OF SERVICE: 04-08-23 @ 07:42    Patient is a 67y old  Male who presents with a chief complaint of Melena (07 Apr 2023 12:10)      SUBJECTIVE / OVERNIGHT EVENTS: No acute events overnight. Patient underwent EGD 4/7. Patient seen and examined this AM. Denies further dark colored stools. Denies chest pain, dyspnea, abd pain, n/v.     MEDICATIONS  (STANDING):  bismuth subsalicylate Liquid 30 milliLiter(s) Oral four times a day  dextrose 5%. 1000 milliLiter(s) (50 mL/Hr) IV Continuous <Continuous>  dextrose 5%. 1000 milliLiter(s) (100 mL/Hr) IV Continuous <Continuous>  dextrose 50% Injectable 25 Gram(s) IV Push once  dextrose 50% Injectable 12.5 Gram(s) IV Push once  dextrose 50% Injectable 25 Gram(s) IV Push once  glucagon  Injectable 1 milliGRAM(s) IntraMuscular once  insulin lispro (ADMELOG) corrective regimen sliding scale   SubCutaneous three times a day before meals  insulin lispro (ADMELOG) corrective regimen sliding scale   SubCutaneous at bedtime  metroNIDAZOLE    Tablet 250 milliGRAM(s) Oral four times a day  pantoprazole Infusion 8 mG/Hr (10 mL/Hr) IV Continuous <Continuous>  tetracycline 500 milliGRAM(s) Oral four times a day    MEDICATIONS  (PRN):  aluminum hydroxide/magnesium hydroxide/simethicone Suspension 30 milliLiter(s) Oral every 4 hours PRN Dyspepsia  dextrose Oral Gel 15 Gram(s) Oral once PRN Blood Glucose LESS THAN 70 milliGRAM(s)/deciliter  melatonin 3 milliGRAM(s) Oral at bedtime PRN Insomnia  ondansetron Injectable 4 milliGRAM(s) IV Push every 8 hours PRN Nausea and/or Vomiting      Vital Signs Last 24 Hrs  T(C): 36.6 (08 Apr 2023 05:25), Max: 36.6 (07 Apr 2023 15:32)  T(F): 97.9 (08 Apr 2023 05:25), Max: 97.9 (07 Apr 2023 15:32)  HR: 62 (08 Apr 2023 05:25) (60 - 91)  BP: 134/83 (08 Apr 2023 05:25) (124/57 - 170/87)  BP(mean): --  RR: 16 (08 Apr 2023 05:25) (14 - 18)  SpO2: 100% (08 Apr 2023 05:25) (97% - 100%)    Parameters below as of 08 Apr 2023 05:25  Patient On (Oxygen Delivery Method): room air      CAPILLARY BLOOD GLUCOSE      POCT Blood Glucose.: 122 mg/dL (07 Apr 2023 21:27)  POCT Blood Glucose.: 132 mg/dL (07 Apr 2023 18:06)  POCT Blood Glucose.: 141 mg/dL (07 Apr 2023 15:40)  POCT Blood Glucose.: 174 mg/dL (07 Apr 2023 12:14)  POCT Blood Glucose.: 166 mg/dL (07 Apr 2023 08:39)    I&O's Summary    07 Apr 2023 07:01  -  08 Apr 2023 07:00  --------------------------------------------------------  IN: 300 mL / OUT: 0 mL / NET: 300 mL        PHYSICAL EXAM:  GENERAL: NAD, lying comfortably in bed   HEAD:  Atraumatic, Normocephalic  EYES: EOMI b/l, PERRLA b/l, conjunctiva and sclera clear  NECK: Supple, No JVD, No LAD   CHEST/LUNG: Clear to auscultation bilaterally; No wheeze or rhonchi  HEART: Regular rate and rhythm; S1 and S2 present, No murmurs, rubs, or gallops  ABDOMEN: Soft, Nontender, Nondistended; Bowel sounds present  EXTREMITIES:  2+ Peripheral Pulses, No clubbing, cyanosis, or edema  NEURO: AAOx3, non-focal   SKIN: No rashes or lesions    LABS:                        9.6    9.21  )-----------( 154      ( 08 Apr 2023 05:50 )             28.6     04-07    138  |  106  |  22  ----------------------------<  199<H>  3.6   |  24  |  0.90    Ca    8.3<L>      07 Apr 2023 07:10  Phos  2.2     04-07  Mg     2.00     04-07    TPro  5.2<L>  /  Alb  3.4  /  TBili  0.2  /  DBili  x   /  AST  13  /  ALT  17  /  AlkPhos  44  04-07              RADIOLOGY & ADDITIONAL TESTS:    Imaging Personally Reviewed:    Consultant(s) Notes Reviewed:      Care Discussed with Consultants/Other Providers:

## 2023-04-08 NOTE — PROVIDER CONTACT NOTE (OTHER) - ACTION/TREATMENT ORDERED:
EKG done and reviewed by MD. blood work for troponin done. pending CT head and x-ray. monitoring is ongoing. pt stated his chest pain is relieved now. monitoring is ongoing.
MD made aware. No interventions noted
MD made aware. no interventions noted

## 2023-04-08 NOTE — PROVIDER CONTACT NOTE (OTHER) - SITUATION
patient's /57
pt has c/o chest pain, headache and RT elbow pain
patient reported numbness of lower back

## 2023-04-08 NOTE — PROGRESS NOTE ADULT - PROBLEM SELECTOR PLAN 1
Multiple episodes of dark stools associated w/ burning epigastric pain concerning for GIB.  Iron studies WNL   GI consulted for EGD, found to have multiple non-bleeding ulcers   Per cards, can hold plavix for now   - s/p repeat EGD 4/7 i/s/o continued melena and downtrending Hgb   - CBC q6 hrs   - c/w PPI q12h  - No CI to restart ASA   - H pylori stool serology positive, pending antigen Multiple episodes of dark stools associated w/ burning epigastric pain concerning for GIB.  Iron studies WNL   GI consulted for EGD, found to have multiple non-bleeding ulcers   Per cards, can hold plavix for now   - s/p repeat EGD 4/7 i/s/o continued melena and downtrending Hgb   - CBC q12 hrs   - c/w PPI q12h  - restarted ASA   - H pylori stool serology positive, pending antigen

## 2023-04-08 NOTE — PROGRESS NOTE ADULT - ATTENDING COMMENTS
66M HTN, DM2, HLD, CAD s/p PCI (2021) on DAPT who presents with 5 days of black stools concerning for UGIB.   pt reports no further dark stool overnight, no abd pain    # UGIB- s/p EGD on 4/5 which showed ulcer with non-bleeding vessel that was clipped. H. pylori studies pending and can be followed up as outpatient. c/w PPI BID x 8 weeks. Monitor CBC q12h  hgb drop yesterday, transfused 2u prbc and given venofer x1, went for repeat EGD 4/7 Non-bleeding gastric ulcer with a nonbleeding visible vessel (Catrachito Class IIa). Clip was placed.  -transition protonix gtt to protonix bid,   - h pylori positive, started on Abx  - gi f/u appreciated  # h/o cad/stent, resume ASA, HOLD plavix  # Fall - needs PT. CTH and XR shoulder negative.   Dispo: poss dc home tomorrow pending PT tonoal 66M HTN, DM2, HLD, CAD s/p PCI (2021) on DAPT who presents with 5 days of black stools concerning for UGIB.   pt reports no further dark stool overnight, no abd pain    # UGIB- s/p EGD on 4/5 which showed ulcer with non-bleeding vessel that was clipped. H. pylori studies pending and can be followed up as outpatient. c/w PPI BID x 8 weeks. Monitor CBC q12h  hgb drop yesterday, transfused 2u prbc and given venofer x1, went for repeat EGD 4/7 Non-bleeding gastric ulcer with a nonbleeding visible vessel (Catrachito Class IIa). Clip was placed.  -transition protonix gtt to protonix bid,   - h pylori positive, started on Abx  -hgb 9.6 today relatively stable  - gi f/u appreciated  # h/o cad/stent, resume ASA, HOLD plavix  # Fall - needs PT. CTH and XR shoulder negative.   Dispo: poss dc home tomorrow pending PT urszula

## 2023-04-08 NOTE — PROVIDER CONTACT NOTE (OTHER) - REASON
patient's /57
patient reported numbness of lower back
pt has c/o chest pain, headache and RT elbow pain

## 2023-04-08 NOTE — PROGRESS NOTE ADULT - SUBJECTIVE AND OBJECTIVE BOX
67y Male s/p EGD on 4/7/23     T(C): 36.6 (04-08-23 @ 05:25), Max: 36.6 (04-07-23 @ 15:32)  HR: 62 (04-08-23 @ 05:25) (60 - 91)  BP: 134/83 (04-08-23 @ 05:25) (124/57 - 170/87)  RR: 16 (04-08-23 @ 05:25) (14 - 18)  SpO2: 100% (04-08-23 @ 05:25) (97% - 100%)  Wt(kg): --    Pt seen, doing well, no anesthesia complications or complaints noted or reported.   No Nausea  Pain well controlled

## 2023-04-08 NOTE — PROGRESS NOTE ADULT - SUBJECTIVE AND OBJECTIVE BOX
Interval Events:   No acute events overnight following repeat endoscopy yesterday.  Patient without acute symptoms at this time.  No further overt GI bleeding.    ROS:   12 point review of systems performed and negative except otherwise noted in HPI.    Hospital Medications:  aluminum hydroxide/magnesium hydroxide/simethicone Suspension 30 milliLiter(s) Oral every 4 hours PRN  bismuth subsalicylate Liquid 30 milliLiter(s) Oral four times a day  dextrose 5%. 1000 milliLiter(s) IV Continuous <Continuous>  dextrose 5%. 1000 milliLiter(s) IV Continuous <Continuous>  dextrose 50% Injectable 25 Gram(s) IV Push once  dextrose 50% Injectable 12.5 Gram(s) IV Push once  dextrose 50% Injectable 25 Gram(s) IV Push once  dextrose Oral Gel 15 Gram(s) Oral once PRN  glucagon  Injectable 1 milliGRAM(s) IntraMuscular once  insulin lispro (ADMELOG) corrective regimen sliding scale   SubCutaneous three times a day before meals  insulin lispro (ADMELOG) corrective regimen sliding scale   SubCutaneous at bedtime  melatonin 3 milliGRAM(s) Oral at bedtime PRN  metroNIDAZOLE    Tablet 250 milliGRAM(s) Oral four times a day  ondansetron Injectable 4 milliGRAM(s) IV Push every 8 hours PRN  pantoprazole Infusion 8 mG/Hr IV Continuous <Continuous>  tetracycline 500 milliGRAM(s) Oral four times a day      PHYSICAL EXAM:   Vital Signs:  Vital Signs Last 24 Hrs  T(C): 36.6 (08 Apr 2023 05:25), Max: 36.6 (07 Apr 2023 15:32)  T(F): 97.9 (08 Apr 2023 05:25), Max: 97.9 (07 Apr 2023 15:32)  HR: 62 (08 Apr 2023 05:25) (60 - 91)  BP: 134/83 (08 Apr 2023 05:25) (124/57 - 170/87)  BP(mean): --  RR: 16 (08 Apr 2023 05:25) (14 - 18)  SpO2: 100% (08 Apr 2023 05:25) (97% - 100%)    Parameters below as of 08 Apr 2023 05:25  Patient On (Oxygen Delivery Method): room air      Daily     Daily     GENERAL: no acute distress  NEURO: alert  HEENT: NCAT, no conjunctival pallor appreciated  CHEST: no respiratory distress, no accessory muscle use  CARDIAC: regular rate, +S1/S2  ABDOMEN: soft, nontender, no rebound or guarding  EXTREMITIES: warm, well perfused  SKIN: no lesions noted    LABS: reviewed                        9.6    9.21  )-----------( 154      ( 08 Apr 2023 05:50 )             28.6     04-08    139  |  104  |  11  ----------------------------<  130<H>  3.6   |  24  |  0.97    Ca    8.8      08 Apr 2023 05:50  Phos  2.7     04-08  Mg     1.90     04-08    TPro  5.2<L>  /  Alb  3.4  /  TBili  0.2  /  DBili  x   /  AST  13  /  ALT  17  /  AlkPhos  44  04-07    LIVER FUNCTIONS - ( 07 Apr 2023 07:10 )  Alb: 3.4 g/dL / Pro: 5.2 g/dL / ALK PHOS: 44 U/L / ALT: 17 U/L / AST: 13 U/L / GGT: x             Interval Diagnostic Studies: see sunrise for full report

## 2023-04-09 ENCOUNTER — TRANSCRIPTION ENCOUNTER (OUTPATIENT)
Age: 68
End: 2023-04-09

## 2023-04-09 VITALS
HEART RATE: 78 BPM | OXYGEN SATURATION: 100 % | DIASTOLIC BLOOD PRESSURE: 83 MMHG | TEMPERATURE: 98 F | RESPIRATION RATE: 17 BRPM | SYSTOLIC BLOOD PRESSURE: 151 MMHG

## 2023-04-09 LAB
ANION GAP SERPL CALC-SCNC: 9 MMOL/L — SIGNIFICANT CHANGE UP (ref 7–14)
BUN SERPL-MCNC: 15 MG/DL — SIGNIFICANT CHANGE UP (ref 7–23)
CALCIUM SERPL-MCNC: 8.7 MG/DL — SIGNIFICANT CHANGE UP (ref 8.4–10.5)
CHLORIDE SERPL-SCNC: 105 MMOL/L — SIGNIFICANT CHANGE UP (ref 98–107)
CO2 SERPL-SCNC: 25 MMOL/L — SIGNIFICANT CHANGE UP (ref 22–31)
CREAT SERPL-MCNC: 1.04 MG/DL — SIGNIFICANT CHANGE UP (ref 0.5–1.3)
EGFR: 78 ML/MIN/1.73M2 — SIGNIFICANT CHANGE UP
GLUCOSE SERPL-MCNC: 119 MG/DL — HIGH (ref 70–99)
HCT VFR BLD CALC: 28.2 % — LOW (ref 39–50)
HGB BLD-MCNC: 9.3 G/DL — LOW (ref 13–17)
MAGNESIUM SERPL-MCNC: 2.1 MG/DL — SIGNIFICANT CHANGE UP (ref 1.6–2.6)
MCHC RBC-ENTMCNC: 28.4 PG — SIGNIFICANT CHANGE UP (ref 27–34)
MCHC RBC-ENTMCNC: 33 GM/DL — SIGNIFICANT CHANGE UP (ref 32–36)
MCV RBC AUTO: 86 FL — SIGNIFICANT CHANGE UP (ref 80–100)
NRBC # BLD: 0 /100 WBCS — SIGNIFICANT CHANGE UP (ref 0–0)
NRBC # FLD: 0.02 K/UL — HIGH (ref 0–0)
PHOSPHATE SERPL-MCNC: 3.2 MG/DL — SIGNIFICANT CHANGE UP (ref 2.5–4.5)
PLATELET # BLD AUTO: 169 K/UL — SIGNIFICANT CHANGE UP (ref 150–400)
POTASSIUM SERPL-MCNC: 4.1 MMOL/L — SIGNIFICANT CHANGE UP (ref 3.5–5.3)
POTASSIUM SERPL-SCNC: 4.1 MMOL/L — SIGNIFICANT CHANGE UP (ref 3.5–5.3)
RBC # BLD: 3.28 M/UL — LOW (ref 4.2–5.8)
RBC # FLD: 15.8 % — HIGH (ref 10.3–14.5)
SODIUM SERPL-SCNC: 139 MMOL/L — SIGNIFICANT CHANGE UP (ref 135–145)
WBC # BLD: 6.74 K/UL — SIGNIFICANT CHANGE UP (ref 3.8–10.5)
WBC # FLD AUTO: 6.74 K/UL — SIGNIFICANT CHANGE UP (ref 3.8–10.5)

## 2023-04-09 PROCEDURE — 99239 HOSP IP/OBS DSCHRG MGMT >30: CPT

## 2023-04-09 RX ORDER — PANTOPRAZOLE SODIUM 20 MG/1
1 TABLET, DELAYED RELEASE ORAL
Qty: 120 | Refills: 0
Start: 2023-04-09 | End: 2023-06-07

## 2023-04-09 RX ORDER — METRONIDAZOLE 500 MG
1 TABLET ORAL
Qty: 52 | Refills: 0
Start: 2023-04-09 | End: 2023-04-21

## 2023-04-09 RX ORDER — PANTOPRAZOLE SODIUM 20 MG/1
1 TABLET, DELAYED RELEASE ORAL
Qty: 60 | Refills: 0
Start: 2023-04-09 | End: 2023-05-08

## 2023-04-09 RX ORDER — TAMSULOSIN HYDROCHLORIDE 0.4 MG/1
1 CAPSULE ORAL
Qty: 0 | Refills: 0 | DISCHARGE
Start: 2023-04-09

## 2023-04-09 RX ADMIN — Medication 250 MILLIGRAM(S): at 13:08

## 2023-04-09 RX ADMIN — Medication 500 MILLIGRAM(S): at 13:08

## 2023-04-09 RX ADMIN — Medication 500 MILLIGRAM(S): at 05:03

## 2023-04-09 RX ADMIN — Medication 30 MILLILITER(S): at 05:23

## 2023-04-09 RX ADMIN — Medication 1: at 13:11

## 2023-04-09 RX ADMIN — PANTOPRAZOLE SODIUM 40 MILLIGRAM(S): 20 TABLET, DELAYED RELEASE ORAL at 05:04

## 2023-04-09 RX ADMIN — Medication 250 MILLIGRAM(S): at 05:04

## 2023-04-09 RX ADMIN — Medication 81 MILLIGRAM(S): at 13:08

## 2023-04-09 NOTE — PROGRESS NOTE ADULT - PROBLEM SELECTOR PLAN 5
RRT called 4/5 after EGD, patient fell while walking to bathroom, felt lightheadedness   CTH negative and Xray elbow WNL  Possibe effect of anesthesia vs dehydration, given 1L bolus   - PT follow up   - fall risk precautions
DVT ppx: on hold due to GIB; SCDs  - Diet: NPO for procedure then, restart CCB    - Dispo: pt independent, walks w/o assistance   - PT consult and fall risk precautions

## 2023-04-09 NOTE — PROGRESS NOTE ADULT - PROBLEM SELECTOR PLAN 1
Multiple episodes of dark stools associated w/ burning epigastric pain concerning for GIB.  Iron studies WNL   GI consulted for EGD, found to have multiple non-bleeding ulcers   Per cards, can hold plavix for now   - s/p repeat EGD 4/7 i/s/o continued melena and downtrending Hgb   - CBC q12 hrs   - c/w PPI q12h  - restarted ASA   - H pylori stool positive, started 4/7

## 2023-04-09 NOTE — DISCHARGE NOTE NURSING/CASE MANAGEMENT/SOCIAL WORK - NSDCPEFALRISK_GEN_ALL_CORE
For information on Fall & Injury Prevention, visit: [email or web address] OR  [email or web address] OR  https://www.cdc.gov/steadi/patient.html

## 2023-04-09 NOTE — DISCHARGE NOTE NURSING/CASE MANAGEMENT/SOCIAL WORK - NSDCVIVACCINE_GEN_ALL_CORE_FT
Tdap; 04-Mar-2018 13:28; Latosha Walker (RN); [employer]; [id number]; IntraMuscular; Deltoid Right.; 0.5 milliLiter(s); VIS (VIS Published: 09-May-2013, VIS Presented: 04-Mar-2018);

## 2023-04-09 NOTE — PROGRESS NOTE ADULT - PROBLEM SELECTOR PROBLEM 3
Pt c/o vaginal irritation x 3 days. Denies vaginal discharge.   
CAD (coronary artery disease)

## 2023-04-09 NOTE — PROGRESS NOTE ADULT - ASSESSMENT
65yo M w/ pmhx HTN, DM, HLD, CAD s/p stents (2021) on DAPT presented w/ 5 day hx of daily black stools admitted for further work up of GIB. EGD with multiple non-bleeding ulcers, once vessel clipped and Hgb stable. RRT called 4/5 for fall i/s/o lightheadedness, likely from dehydration s/p 1L IVF. Found to have continued melena and anemia, repeat EGD with additional ulcer and hemostat clip placed.

## 2023-04-09 NOTE — PROGRESS NOTE ADULT - PROVIDER SPECIALTY LIST ADULT
Anesthesia
Gastroenterology
Anesthesia
Gastroenterology
Gastroenterology
Internal Medicine

## 2023-04-09 NOTE — PROGRESS NOTE ADULT - ATTENDING COMMENTS
66M HTN, DM2, HLD, CAD s/p PCI (2021) on DAPT who presents with 5 days of black stools concerning for UGIB.   pt reports no further dark stool overnight, no abd pain    # UGIB- s/p EGD on 4/5 which showed ulcer with non-bleeding vessel that was clipped. H. pylori studies pending and can be followed up as outpatient. c/w PPI BID x 8 weeks. Monitor CBC q12h  transfused 2u prbc on 4/7,  given venofer x1, repeat EGD 4/7 Non-bleeding gastric ulcer with a nonbleeding visible vessel (Catrachito Class IIa). Clip was placed.  - po protonix 40 bid   - h pylori positive, started on Abx  - hgb stable 9.3, no further dark stool  -dc home today with outpt f/u GI  Time spent on discharge 31 minutes coordinating discharge plan and discussing with patient and family.    # h/o cad/stent, resume ASA, HOLD plavix    # Fall - PT recs home with home PT. CTH and XR shoulder negative.

## 2023-04-09 NOTE — PROGRESS NOTE ADULT - PROBLEM SELECTOR PLAN 2
on Losartan 100mg QD and Amlodipine 5mg   - will hold iso GIB; if Hgb stable will restart

## 2023-04-09 NOTE — PROGRESS NOTE ADULT - PROBLEM SELECTOR PLAN 6
DVT ppx: on hold due to GIB; SCDs  - Diet: CCB    - Dispo: pt independent, walks w/o assistance
DVT ppx: on hold due to GIB; SCDs  - Diet: NPO for procedure then, restart CCB    - Dispo: pt independent, walks w/o assistance
DVT ppx: on hold due to GIB; SCDs  - Diet: CCB    - Dispo: pt independent, walks w/o assistance. Home with home PT

## 2023-04-09 NOTE — PROGRESS NOTE ADULT - SUBJECTIVE AND OBJECTIVE BOX
PATIENT: TETE NICHOLAS, MRN: [id number]    CHIEF COMPLAINT: Patient is a 67y old  Male who presents with a chief complaint of Melena (08 Apr 2023 10:16)      INTERVAL HISTORY/OVERNIGHT EVENTS: No overnight events. At bedside, patient has been sleeping and eating well. Denies N/V/D/C. Last BM x1 regular yesterday. Denies abdominal pain. Denies chest pain or SOB, cough. Has been ambulating with assistance. Oriented to person, place, and time. Breathing comfortably on room air.    REVIEW OF SYSTEMS:    Constitutional:     [ ] negative [ ] fevers [ ] chills [ ] weight loss [ ] weight gain  HEENT:                  [ ] negative [ ] dry eyes [ ] eye irritation [ ] postnasal drip [ ] nasal congestion  CV:                         [ ] negative  [ ] chest pain [ ] orthopnea [ ] palpitations [ ] murmur  Resp:                     [ ] negative [ ] cough [ ] shortness of breath [ ] dyspnea [ ] wheezing [ ] sputum [ ] hemoptysis  GI:                          [ ] negative [ ] nausea [ ] vomiting [ ] diarrhea [ ] constipation [ ] abd pain [ ] dysphagia   :                        [ ] negative [ ] dysuria [ ] nocturia [ ] hematuria [ ] increased urinary frequency  Musculoskeletal: [ ] negative [ ] back pain [ ] myalgias [ ] arthralgias [ ] fracture  Skin:                       [ ] negative [ ] rash [ ] itch  Neurological:        [ ] negative [ ] headache [ ] dizziness [ ] syncope [ ] weakness [ ] numbness  Psychiatric:           [ ] negative [ ] anxiety [ ] depression  Endocrine:            [ ] negative [ ] diabetes [ ] thyroid problem  Heme/Lymph:      [ ] negative [ ] anemia [ ] bleeding problem  Allergic/Immune: [ ] negative [ ] itchy eyes [ ] nasal discharge [ ] hives [ ] angioedema    [ ] All other systems negative  [ ] Unable to assess ROS because ________.    MEDICATIONS:  MEDICATIONS  (STANDING):  aspirin  chewable 81 milliGRAM(s) Oral daily  bismuth subsalicylate Liquid 30 milliLiter(s) Oral four times a day  dextrose 5%. 1000 milliLiter(s) (50 mL/Hr) IV Continuous <Continuous>  dextrose 5%. 1000 milliLiter(s) (100 mL/Hr) IV Continuous <Continuous>  dextrose 50% Injectable 25 Gram(s) IV Push once  dextrose 50% Injectable 12.5 Gram(s) IV Push once  dextrose 50% Injectable 25 Gram(s) IV Push once  glucagon  Injectable 1 milliGRAM(s) IntraMuscular once  insulin lispro (ADMELOG) corrective regimen sliding scale   SubCutaneous three times a day before meals  insulin lispro (ADMELOG) corrective regimen sliding scale   SubCutaneous at bedtime  metroNIDAZOLE    Tablet 250 milliGRAM(s) Oral four times a day  pantoprazole  Injectable 40 milliGRAM(s) IV Push every 12 hours  tetracycline 500 milliGRAM(s) Oral four times a day    MEDICATIONS  (PRN):  aluminum hydroxide/magnesium hydroxide/simethicone Suspension 30 milliLiter(s) Oral every 4 hours PRN Dyspepsia  dextrose Oral Gel 15 Gram(s) Oral once PRN Blood Glucose LESS THAN 70 milliGRAM(s)/deciliter  melatonin 3 milliGRAM(s) Oral at bedtime PRN Insomnia  ondansetron Injectable 4 milliGRAM(s) IV Push every 8 hours PRN Nausea and/or Vomiting      ALLERGIES: Allergies    No Known Allergies    Intolerances        OBJECTIVE:  ICU Vital Signs Last 24 Hrs  T(C): 36.6 (09 Apr 2023 05:19), Max: 36.6 (09 Apr 2023 05:19)  T(F): 97.8 (09 Apr 2023 05:19), Max: 97.8 (09 Apr 2023 05:19)  HR: 64 (09 Apr 2023 05:19) (64 - 74)  BP: 144/84 (09 Apr 2023 05:19) (142/89 - 157/74)  BP(mean): --  ABP: --  ABP(mean): --  RR: 18 (09 Apr 2023 05:19) (17 - 18)  SpO2: 100% (09 Apr 2023 05:19) (99% - 100%)    O2 Parameters below as of 09 Apr 2023 05:19  Patient On (Oxygen Delivery Method): room air            Adult Advanced Hemodynamics Last 24 Hrs  CVP(mm Hg): --  CVP(cm H2O): --  CO: --  CI: --  PA: --  PA(mean): --  PCWP: --  SVR: --  SVRI: --  PVR: --  PVRI: --  CAPILLARY BLOOD GLUCOSE      POCT Blood Glucose.: 181 mg/dL (08 Apr 2023 21:15)  POCT Blood Glucose.: 178 mg/dL (08 Apr 2023 17:42)  POCT Blood Glucose.: 90 mg/dL (08 Apr 2023 12:54)  POCT Blood Glucose.: 168 mg/dL (08 Apr 2023 08:31)    CAPILLARY BLOOD GLUCOSE      POCT Blood Glucose.: 181 mg/dL (08 Apr 2023 21:15)    I&O's Summary    Daily     Daily     PHYSICAL EXAMINATION:  GENERAL: NAD, lying comfortably in bed   HEAD:  Atraumatic, Normocephalic  EYES: EOMI b/l, PERRLA b/l, conjunctiva and sclera clear  NECK: Supple, No JVD, No LAD   CHEST/LUNG: Clear to auscultation bilaterally; No wheeze or rhonchi  HEART: Regular rate and rhythm; S1 and S2 present, No murmurs, rubs, or gallops  ABDOMEN: Soft, Nontender, Nondistended; Bowel sounds present  EXTREMITIES:  2+ Peripheral Pulses, No clubbing, cyanosis, or edema  NEURO: AAOx3, non-focal   SKIN: No rashes or lesions  LABS:                          9.3    6.74  )-----------( 169      ( 09 Apr 2023 05:20 )             28.2     04-09    139  |  105  |  15  ----------------------------<  119<H>  4.1   |  25  |  1.04    Ca    8.7      09 Apr 2023 05:20  Phos  3.2     04-09  Mg     2.10     04-09           PATIENT: TETE NICHOLAS, MRN: [id number]    CHIEF COMPLAINT: Patient is a 67y old  Male who presents with a chief complaint of Melena (08 Apr 2023 10:16)      INTERVAL HISTORY/OVERNIGHT EVENTS: No overnight events.         MEDICATIONS:  MEDICATIONS  (STANDING):  aspirin  chewable 81 milliGRAM(s) Oral daily  bismuth subsalicylate Liquid 30 milliLiter(s) Oral four times a day  dextrose 5%. 1000 milliLiter(s) (50 mL/Hr) IV Continuous <Continuous>  dextrose 5%. 1000 milliLiter(s) (100 mL/Hr) IV Continuous <Continuous>  dextrose 50% Injectable 25 Gram(s) IV Push once  dextrose 50% Injectable 12.5 Gram(s) IV Push once  dextrose 50% Injectable 25 Gram(s) IV Push once  glucagon  Injectable 1 milliGRAM(s) IntraMuscular once  insulin lispro (ADMELOG) corrective regimen sliding scale   SubCutaneous three times a day before meals  insulin lispro (ADMELOG) corrective regimen sliding scale   SubCutaneous at bedtime  metroNIDAZOLE    Tablet 250 milliGRAM(s) Oral four times a day  pantoprazole  Injectable 40 milliGRAM(s) IV Push every 12 hours  tetracycline 500 milliGRAM(s) Oral four times a day    MEDICATIONS  (PRN):  aluminum hydroxide/magnesium hydroxide/simethicone Suspension 30 milliLiter(s) Oral every 4 hours PRN Dyspepsia  dextrose Oral Gel 15 Gram(s) Oral once PRN Blood Glucose LESS THAN 70 milliGRAM(s)/deciliter  melatonin 3 milliGRAM(s) Oral at bedtime PRN Insomnia  ondansetron Injectable 4 milliGRAM(s) IV Push every 8 hours PRN Nausea and/or Vomiting      ALLERGIES: Allergies    No Known Allergies    Intolerances        OBJECTIVE:  ICU Vital Signs Last 24 Hrs  T(C): 36.6 (09 Apr 2023 05:19), Max: 36.6 (09 Apr 2023 05:19)  T(F): 97.8 (09 Apr 2023 05:19), Max: 97.8 (09 Apr 2023 05:19)  HR: 64 (09 Apr 2023 05:19) (64 - 74)  BP: 144/84 (09 Apr 2023 05:19) (142/89 - 157/74)  BP(mean): --  ABP: --  ABP(mean): --  RR: 18 (09 Apr 2023 05:19) (17 - 18)  SpO2: 100% (09 Apr 2023 05:19) (99% - 100%)    O2 Parameters below as of 09 Apr 2023 05:19  Patient On (Oxygen Delivery Method): room air            Adult Advanced Hemodynamics Last 24 Hrs  CVP(mm Hg): --  CVP(cm H2O): --  CO: --  CI: --  PA: --  PA(mean): --  PCWP: --  SVR: --  SVRI: --  PVR: --  PVRI: --  CAPILLARY BLOOD GLUCOSE      POCT Blood Glucose.: 181 mg/dL (08 Apr 2023 21:15)  POCT Blood Glucose.: 178 mg/dL (08 Apr 2023 17:42)  POCT Blood Glucose.: 90 mg/dL (08 Apr 2023 12:54)  POCT Blood Glucose.: 168 mg/dL (08 Apr 2023 08:31)    CAPILLARY BLOOD GLUCOSE      POCT Blood Glucose.: 181 mg/dL (08 Apr 2023 21:15)    I&O's Summary    Daily     Daily     PHYSICAL EXAMINATION:  GENERAL: NAD, lying comfortably in bed   HEAD:  Atraumatic, Normocephalic  EYES: EOMI b/l, PERRLA b/l, conjunctiva and sclera clear  NECK: Supple, No JVD, No LAD   CHEST/LUNG: Clear to auscultation bilaterally; No wheeze or rhonchi  HEART: Regular rate and rhythm; S1 and S2 present, No murmurs, rubs, or gallops  ABDOMEN: Soft, Nontender, Nondistended; Bowel sounds present  EXTREMITIES:  2+ Peripheral Pulses, No clubbing, cyanosis, or edema  NEURO: AAOx3, non-focal   SKIN: No rashes or lesions  LABS:                          9.3    6.74  )-----------( 169      ( 09 Apr 2023 05:20 )             28.2     04-09    139  |  105  |  15  ----------------------------<  119<H>  4.1   |  25  |  1.04    Ca    8.7      09 Apr 2023 05:20  Phos  3.2     04-09  Mg     2.10     04-09

## 2023-04-09 NOTE — PROGRESS NOTE ADULT - PROBLEM SELECTOR PLAN 4
on Glipizide at home  - hold PO antihyperglycemic  - ISS q6h while NPO; then TID AC + HS  - CCB diet

## 2023-04-09 NOTE — DISCHARGE NOTE NURSING/CASE MANAGEMENT/SOCIAL WORK - NSSCNAMETXT_GEN_ALL_CORE
[facility] [phone]. Initial visit will be day after discharge home. A nurse will call prior to home visit

## 2023-04-09 NOTE — DISCHARGE NOTE NURSING/CASE MANAGEMENT/SOCIAL WORK - PATIENT PORTAL LINK FT
You can access the FollowMyHealth Patient Portal offered by [facility] by registering at the following website: [email or web address]. By joining [employer]’s FollowMyHealth portal, you will also be able to view your health information using other applications (apps) compatible with our system.

## 2023-05-24 PROBLEM — Z00.00 ENCOUNTER FOR PREVENTIVE HEALTH EXAMINATION: Status: ACTIVE | Noted: 2023-05-24

## 2023-07-24 ENCOUNTER — EMERGENCY (EMERGENCY)
Facility: HOSPITAL | Age: 68
LOS: 1 days | Discharge: HOME | End: 2023-07-24
Attending: EMERGENCY MEDICINE | Admitting: EMERGENCY MEDICINE
Payer: MEDICARE

## 2023-07-24 VITALS
HEART RATE: 86 BPM | DIASTOLIC BLOOD PRESSURE: 81 MMHG | TEMPERATURE: 98 F | SYSTOLIC BLOOD PRESSURE: 139 MMHG | OXYGEN SATURATION: 99 % | RESPIRATION RATE: 16 BRPM

## 2023-07-24 VITALS
HEART RATE: 74 BPM | RESPIRATION RATE: 16 BRPM | SYSTOLIC BLOOD PRESSURE: 137 MMHG | TEMPERATURE: 98 F | DIASTOLIC BLOOD PRESSURE: 81 MMHG | OXYGEN SATURATION: 98 %

## 2023-07-24 LAB
ALBUMIN SERPL ELPH-MCNC: 3.8 G/DL — SIGNIFICANT CHANGE UP (ref 3.3–5)
ALP SERPL-CCNC: 71 U/L — SIGNIFICANT CHANGE UP (ref 40–120)
ALT FLD-CCNC: 23 U/L — SIGNIFICANT CHANGE UP (ref 4–41)
ANION GAP SERPL CALC-SCNC: 12 MMOL/L — SIGNIFICANT CHANGE UP (ref 7–14)
APPEARANCE UR: CLEAR — SIGNIFICANT CHANGE UP
AST SERPL-CCNC: 22 U/L — SIGNIFICANT CHANGE UP (ref 4–40)
BACTERIA # UR AUTO: NEGATIVE /HPF — SIGNIFICANT CHANGE UP
BASOPHILS # BLD AUTO: 0.03 K/UL — SIGNIFICANT CHANGE UP (ref 0–0.2)
BASOPHILS NFR BLD AUTO: 0.3 % — SIGNIFICANT CHANGE UP (ref 0–2)
BILIRUB SERPL-MCNC: 1.2 MG/DL — SIGNIFICANT CHANGE UP (ref 0.2–1.2)
BILIRUB UR-MCNC: NEGATIVE — SIGNIFICANT CHANGE UP
BUN SERPL-MCNC: 9 MG/DL — SIGNIFICANT CHANGE UP (ref 7–23)
CALCIUM SERPL-MCNC: 8.9 MG/DL — SIGNIFICANT CHANGE UP (ref 8.4–10.5)
CHLORIDE SERPL-SCNC: 103 MMOL/L — SIGNIFICANT CHANGE UP (ref 98–107)
CO2 SERPL-SCNC: 24 MMOL/L — SIGNIFICANT CHANGE UP (ref 22–31)
COLOR SPEC: YELLOW — SIGNIFICANT CHANGE UP
CREAT SERPL-MCNC: 1.16 MG/DL — SIGNIFICANT CHANGE UP (ref 0.5–1.3)
DIFF PNL FLD: ABNORMAL
EGFR: 68 ML/MIN/1.73M2 — SIGNIFICANT CHANGE UP
EOSINOPHIL # BLD AUTO: 0.1 K/UL — SIGNIFICANT CHANGE UP (ref 0–0.5)
EOSINOPHIL NFR BLD AUTO: 1 % — SIGNIFICANT CHANGE UP (ref 0–6)
EPI CELLS # UR: SIGNIFICANT CHANGE UP
GLUCOSE SERPL-MCNC: 221 MG/DL — HIGH (ref 70–99)
GLUCOSE UR QL: 100 MG/DL
HCT VFR BLD CALC: 35 % — LOW (ref 39–50)
HGB BLD-MCNC: 11.3 G/DL — LOW (ref 13–17)
HYALINE CASTS # UR AUTO: PRESENT
IANC: 7.42 K/UL — HIGH (ref 1.8–7.4)
IMM GRANULOCYTES NFR BLD AUTO: 0.4 % — SIGNIFICANT CHANGE UP (ref 0–0.9)
KETONES UR-MCNC: NEGATIVE MG/DL — SIGNIFICANT CHANGE UP
LEUKOCYTE ESTERASE UR-ACNC: ABNORMAL
LYMPHOCYTES # BLD AUTO: 1.57 K/UL — SIGNIFICANT CHANGE UP (ref 1–3.3)
LYMPHOCYTES # BLD AUTO: 15.7 % — SIGNIFICANT CHANGE UP (ref 13–44)
MCHC RBC-ENTMCNC: 27.1 PG — SIGNIFICANT CHANGE UP (ref 27–34)
MCHC RBC-ENTMCNC: 32.3 GM/DL — SIGNIFICANT CHANGE UP (ref 32–36)
MCV RBC AUTO: 83.9 FL — SIGNIFICANT CHANGE UP (ref 80–100)
MONOCYTES # BLD AUTO: 0.86 K/UL — SIGNIFICANT CHANGE UP (ref 0–0.9)
MONOCYTES NFR BLD AUTO: 8.6 % — SIGNIFICANT CHANGE UP (ref 2–14)
NEUTROPHILS # BLD AUTO: 7.42 K/UL — HIGH (ref 1.8–7.4)
NEUTROPHILS NFR BLD AUTO: 74 % — SIGNIFICANT CHANGE UP (ref 43–77)
NITRITE UR-MCNC: NEGATIVE — SIGNIFICANT CHANGE UP
NRBC # BLD: 0 /100 WBCS — SIGNIFICANT CHANGE UP (ref 0–0)
NRBC # FLD: 0 K/UL — SIGNIFICANT CHANGE UP (ref 0–0)
PH UR: 6 — SIGNIFICANT CHANGE UP (ref 5–8)
PLATELET # BLD AUTO: 173 K/UL — SIGNIFICANT CHANGE UP (ref 150–400)
POTASSIUM SERPL-MCNC: 3.8 MMOL/L — SIGNIFICANT CHANGE UP (ref 3.5–5.3)
POTASSIUM SERPL-SCNC: 3.8 MMOL/L — SIGNIFICANT CHANGE UP (ref 3.5–5.3)
PROT SERPL-MCNC: 7.2 G/DL — SIGNIFICANT CHANGE UP (ref 6–8.3)
PROT UR-MCNC: 100 MG/DL
RBC # BLD: 4.17 M/UL — LOW (ref 4.2–5.8)
RBC # FLD: 14.4 % — SIGNIFICANT CHANGE UP (ref 10.3–14.5)
RBC CASTS # UR COMP ASSIST: 4 /HPF — SIGNIFICANT CHANGE UP (ref 0–4)
SODIUM SERPL-SCNC: 139 MMOL/L — SIGNIFICANT CHANGE UP (ref 135–145)
SP GR SPEC: 1.02 — SIGNIFICANT CHANGE UP (ref 1–1.03)
UROBILINOGEN FLD QL: 0.2 MG/DL — SIGNIFICANT CHANGE UP (ref 0.2–1)
WBC # BLD: 10.02 K/UL — SIGNIFICANT CHANGE UP (ref 3.8–10.5)
WBC # FLD AUTO: 10.02 K/UL — SIGNIFICANT CHANGE UP (ref 3.8–10.5)
WBC UR QL: 10 /HPF — HIGH (ref 0–5)

## 2023-07-24 PROCEDURE — 76770 US EXAM ABDO BACK WALL COMP: CPT | Mod: 26

## 2023-07-24 PROCEDURE — 99284 EMERGENCY DEPT VISIT MOD MDM: CPT

## 2023-07-24 RX ORDER — CEPHALEXIN 500 MG
500 CAPSULE ORAL ONCE
Refills: 0 | Status: COMPLETED | OUTPATIENT
Start: 2023-07-24 | End: 2023-07-24

## 2023-07-24 RX ORDER — SODIUM CHLORIDE 9 MG/ML
1000 INJECTION, SOLUTION INTRAVENOUS ONCE
Refills: 0 | Status: COMPLETED | OUTPATIENT
Start: 2023-07-24 | End: 2023-07-24

## 2023-07-24 RX ORDER — POLYETHYLENE GLYCOL 3350 17 G/17G
17 POWDER, FOR SOLUTION ORAL
Qty: 1 | Refills: 0
Start: 2023-07-24 | End: 2023-08-02

## 2023-07-24 RX ORDER — CEPHALEXIN 500 MG
1 CAPSULE ORAL
Qty: 14 | Refills: 0
Start: 2023-07-24 | End: 2023-07-30

## 2023-07-24 RX ADMIN — SODIUM CHLORIDE 1000 MILLILITER(S): 9 INJECTION, SOLUTION INTRAVENOUS at 10:48

## 2023-07-24 RX ADMIN — Medication 500 MILLIGRAM(S): at 13:21

## 2023-07-24 NOTE — ED PROVIDER NOTE - CLINICAL SUMMARY MEDICAL DECISION MAKING FREE TEXT BOX
67M p/w dysuria and fever and urinary urgency x 4 days, persistent so came in.  pt also reports rectal pain and bloody stools.  pt had 2 episodes in the last day.  Pt reports fever to 101 last night.  pt reports having to force the urine.  PMHX HTN HLD CAD PUD.  Meds ASA, zocor, flomax, glipizide, protonix, norvasc, lopressor.  no T.  NKDA.  [facility] [id number] [place] [address].  VS wnl.  Exam NAD, ambulatory.  Nontender abd.  Small amount of cloudy urine at bedside.  Rectal exam chap RN David - tiny hemorrhoids, brown mucous on glove, no active bleeding.  No stool in vault.  Plan check labs, urinalysis, Ucx.  Given DM will treat for UTI x 1 week.  No sign of sepsis here.  Pt likely has hemorrhoids - would rx miralax and have pt follow up with own GI.

## 2023-07-24 NOTE — ED PROVIDER NOTE - OBJECTIVE STATEMENT
67M p/w dysuria and fever and urinary urgency x 4 days, persistent so came in.  pt also reports rectal pain and bloody stools.  pt had 2 episodes in the last day.  Pt reports fever to 101 last night.  pt reports having to force the urine.  PMHX HTN HLD CAD PUD.  Meds ASA, zocor, flomax, glipizide, protonix, norvasc, lopressor.  no T.  NKDA.  [facility] [id number].  VS wnl.  Exam NAD, ambulatory.  Nontender abd.  Small amount of cloudy urine at bedside.  Rectal exam chap CINDY David - tiny hemorrhoids, brown mucous on glove, no active bleeding.  No stool in vault.  Plan check labs, urinalysis, Ucx.  Given DM will treat for UTI x 1 week.  No sign of sepsis here.  Pt likely has hemorrhoids - would rx miralax and have pt follow up with own GI.    VS:  unremarkable    GEN - NAD;   well appearing;   A+O x3   HEAD - NC/AT     ENT - PEERL, EOMI, mucous membranes    moist , no discharge      NECK: Neck supple, non-tender without lymphadenopathy, no masses, no JVD  PULM - CTA b/l,  symmetric breath sounds  COR -  normal heart sounds    ABD - , ND, NT, soft,  Rectal - chap RN David -  tiny hemorrhoids, brown mucous on glove, no active bleeding.    BACK - no CVA tenderness, nontender spine     EXTREMS - no edema, no deformity, warm and well perfused    SKIN - no rash    or bruising      NEUROLOGIC - alert, face symmetric, speech fluent, sensation nl, motor no focal deficit.

## 2023-07-24 NOTE — ED ADULT NURSE NOTE - OBJECTIVE STATEMENT
Received pt in bed A and Ox3 in NAD, c/o urgency to urinate and frequent but scant urinations, denies burning on urination, fever chills flank pain, states saw some bright red blood in stool today, abd soft non distended non tender,  Chaperoned rectal exam for MD, no active bleeding noted, further orders  received.

## 2023-07-24 NOTE — ED PROVIDER NOTE - NSFOLLOWUPINSTRUCTIONS_ED_ALL_ED_FT
FOLLOW UP WITH YOUR  DOCTOR WITHIN 1 WEEK  BRING THE COPIES OF YOUR RESULTS WITH YOU (PROVIDED)  CAN TAKE TYLENOL 650MG ORALLY EVERY 6 HOURS FOR PAIN OR FEVER.  MIRALAX 1 CAPFULL DAILY MIX WITH WATER, STOP IF STOOLS ARE WATERY  RETURN TO ED FOR NEW OR WORSENING SYMPTOMS. FOLLOW UP WITH YOUR  DOCTOR WITHIN 1 WEEK  KEFLEX 500MG ORALLY TWICE DAILY FOR 7 DAYS  BRING THE COPIES OF YOUR RESULTS WITH YOU (PROVIDED)  CAN TAKE TYLENOL 650MG ORALLY EVERY 6 HOURS FOR PAIN OR FEVER.  MIRALAX 1 CAPFULL DAILY MIX WITH WATER, STOP IF STOOLS ARE WATERY  RETURN TO ED FOR NEW OR WORSENING SYMPTOMS.

## 2023-07-24 NOTE — ED ADULT TRIAGE NOTE - CHIEF COMPLAINT QUOTE
pt c/o dysuria and fever x 4 days. states he last took tylenol last night. pt also states he has hemorrhoids, c/o rectal pain.

## 2023-07-24 NOTE — ED PROVIDER NOTE - PHYSICAL EXAMINATION
VS:  unremarkable    GEN - NAD;   well appearing;   A+O x3   HEAD - NC/AT     ENT - PEERL, EOMI, mucous membranes    moist , no discharge      NECK: Neck supple, non-tender without lymphadenopathy, no masses, no JVD  PULM - CTA b/l,  symmetric breath sounds  COR -  normal heart sounds    ABD - , ND, NT, soft,  Rectal - chap RN Julianne -  tiny hemorrhoids, brown mucous on glove, no active bleeding.    BACK - no CVA tenderness, nontender spine     EXTREMS - no edema, no deformity, warm and well perfused    SKIN - no rash    or bruising      NEUROLOGIC - alert, face symmetric, speech fluent, sensation nl, motor no focal deficit.

## 2023-07-24 NOTE — ED PROVIDER NOTE - PATIENT PORTAL LINK FT
You can access the FollowMyHealth Patient Portal offered by [facility] by registering at the following website: [email or web address]. By joining [employer]’s FollowMyHealth portal, you will also be able to view your health information using other applications (apps) compatible with our system.

## 2023-07-28 RX ORDER — AMOXICILLIN 250 MG/5ML
1 SUSPENSION, RECONSTITUTED, ORAL (ML) ORAL
Qty: 21 | Refills: 0
Start: 2023-07-28 | End: 2023-08-03

## 2023-07-28 NOTE — ED POST DISCHARGE NOTE - RESULT SUMMARY
UCx + for Enterococcus 50,000-99,000. Pt was sent home on keflex which is not an adequate treatment. I spoke with pts daughter about results and will send amoxicillin to his pharmacy. Pt currently feeling better and symptoms are improving.

## 2023-12-19 ENCOUNTER — APPOINTMENT (OUTPATIENT)
Dept: GASTROENTEROLOGY | Facility: HOSPITAL | Age: 68
End: 2023-12-19

## 2024-03-25 ENCOUNTER — APPOINTMENT (OUTPATIENT)
Dept: ORTHOPEDIC SURGERY | Facility: CLINIC | Age: 69
End: 2024-03-25
Payer: MEDICARE

## 2024-03-25 VITALS
HEART RATE: 60 BPM | WEIGHT: 145 LBS | BODY MASS INDEX: 23.3 KG/M2 | SYSTOLIC BLOOD PRESSURE: 161 MMHG | DIASTOLIC BLOOD PRESSURE: 86 MMHG | HEIGHT: 66 IN

## 2024-03-25 PROCEDURE — 99204 OFFICE O/P NEW MOD 45 MIN: CPT | Mod: 25

## 2024-03-25 PROCEDURE — 72082 X-RAY EXAM ENTIRE SPI 2/3 VW: CPT

## 2024-03-25 NOTE — PHYSICAL EXAM
[de-identified] : Lumbar Physical Exam   Gait - Normal  Station - Normal   Sagittal balance - Normal   Compensatory mechanism? - None     Reflexes    Patellar - normal    Gastroc - normal    Clonus - No    Hip Exam - Normal   Straight leg raise - none   Pulses - 2+ dp/pt   Range of motion - normal    Sensation   Sensation intact to light touch in L1, L2, L3, L4, L5 and S1 dermatomes bilaterally     Motor             IP Quad HS TA Gastroc EHL   Right 5/5  5/5   5/5 5/5    5/5     5/5   Left   5/5  5/5   5/5 5/5    5/5      5/5  [de-identified] : Scoliosis Rads SVA wnl Facet arthropathy Disc Degeneration

## 2024-03-25 NOTE — HISTORY OF PRESENT ILLNESS
[de-identified] : Patient is a 68-year-old male who presents for initial eval of acute back pain for about 1 week. Followed up to ER 5 days ago d/t sxs. Denies radiating LE sxs. Difficulty walking. Sxs improved with PT.

## 2024-03-25 NOTE — ASSESSMENT
[FreeTextEntry1] : This is a 68 year male with acute back pain. I had a lengthy discussion with the patient in regard to treatment plan and diagnosis. There are no red flag findings on imaging nor are there any red flag findings on clinical exams. Therefore, we will proceed with a course of conservative treatment. This would include physical therapy/home exercise program, Tylenol as medically indicated. The patient will follow up with me in approximately 5 to 6 weeks. I encouraged the patient to follow-up sooner if there are any new or worsening symptoms.

## 2024-03-25 NOTE — ADDENDUM
[FreeTextEntry1] :  I, Claudia Arvizu, acted solely as a scribe for Dr. Diogo Cotto MD on this date 03/25/2024   All medical record entries made by the Scribe were at my, Dr. Diogo Cotto MD., direction and personally dictated by me on 03/25/2024. I have reviewed the chart and agree that the record accurately reflects my personal performance of the history, physical exam, assessment and plan. I have also personally directed, reviewed, and agreed with the chart.

## 2024-05-07 PROBLEM — M54.9 ACUTE BACK PAIN, UNSPECIFIED BACK LOCATION, UNSPECIFIED BACK PAIN LATERALITY: Status: ACTIVE | Noted: 2024-03-22

## 2024-05-08 ENCOUNTER — APPOINTMENT (OUTPATIENT)
Dept: ORTHOPEDIC SURGERY | Facility: CLINIC | Age: 69
End: 2024-05-08

## 2024-05-08 DIAGNOSIS — M54.9 DORSALGIA, UNSPECIFIED: ICD-10-CM

## (undated) DEVICE — DENTURE CUP PINK

## (undated) DEVICE — TUBING MEDI-VAC W MAXIGRIP CONNECTORS 1/4"X6'

## (undated) DEVICE — UNDERPAD LINEN SAVER 17 X 24"

## (undated) DEVICE — BIOPSY FORCEP COLD DISP

## (undated) DEVICE — PACK IV START WITH CHG

## (undated) DEVICE — LUBRICATING JELLY HR ONE SHOT 3G

## (undated) DEVICE — BIOPSY FORCEP RADIAL JAW 4 STANDARD WITH NEEDLE

## (undated) DEVICE — SALIVA EJECTOR (BLUE)

## (undated) DEVICE — DRSG CURITY GAUZE SPONGE 4 X 4" 12-PLY NON-STERILE

## (undated) DEVICE — CONTAINER FORMALIN 80ML YELLOW

## (undated) DEVICE — DRSG BANDAID 0.75X3"

## (undated) DEVICE — BITE BLOCK ADULT 20 X 27MM (GREEN)

## (undated) DEVICE — BASIN EMESIS 10IN GRADUATED MAUVE

## (undated) DEVICE — GOWN LG

## (undated) DEVICE — LINE BREATHE SAMPLNG

## (undated) DEVICE — TUBING IV SET GRAVITY 3Y 100" MACRO

## (undated) DEVICE — CLAMP BX HOT RAD JAW 3

## (undated) DEVICE — DRSG 2X2

## (undated) DEVICE — ELCTR ECG CONDUCTIVE ADHESIVE

## (undated) DEVICE — CATH IV SAFE BC 22G X 1" (BLUE)